# Patient Record
Sex: FEMALE | Race: WHITE | Employment: OTHER | URBAN - METROPOLITAN AREA
[De-identification: names, ages, dates, MRNs, and addresses within clinical notes are randomized per-mention and may not be internally consistent; named-entity substitution may affect disease eponyms.]

---

## 2018-02-13 ENCOUNTER — TELEPHONE (OUTPATIENT)
Dept: CARDIOLOGY CLINIC | Age: 83
End: 2018-02-13

## 2018-02-13 NOTE — TELEPHONE ENCOUNTER
Dr. Loco Brand called from  questioning if it is ok for patient to have Endoscopy tomorrow. Patient has an appointment with you on Thursday to be seen for abnormal nuclear and SIMMS.  was calling to see if it was ok to have this done before her visit wit you. Please Advise. Call [facility] back before 12 at [phone] or after 12 call [address] [place] [address] at [phone].

## 2018-02-15 ENCOUNTER — OFFICE VISIT (OUTPATIENT)
Dept: CARDIOLOGY CLINIC | Age: 83
End: 2018-02-15

## 2018-02-15 VITALS
HEIGHT: 64 IN | DIASTOLIC BLOOD PRESSURE: 62 MMHG | HEART RATE: 103 BPM | SYSTOLIC BLOOD PRESSURE: 131 MMHG | WEIGHT: 177 LBS | BODY MASS INDEX: 30.22 KG/M2

## 2018-02-15 DIAGNOSIS — R06.02 SOB (SHORTNESS OF BREATH) ON EXERTION: ICD-10-CM

## 2018-02-15 DIAGNOSIS — I10 ESSENTIAL HYPERTENSION: ICD-10-CM

## 2018-02-15 DIAGNOSIS — R11.0 NAUSEA: ICD-10-CM

## 2018-02-15 DIAGNOSIS — K76.0 FATTY LIVER: ICD-10-CM

## 2018-02-15 DIAGNOSIS — E78.5 HYPERLIPIDEMIA, UNSPECIFIED HYPERLIPIDEMIA TYPE: ICD-10-CM

## 2018-02-15 DIAGNOSIS — R94.39 ABNORMAL NUCLEAR STRESS TEST: Primary | ICD-10-CM

## 2018-02-15 RX ORDER — AMLODIPINE BESYLATE 5 MG/1
5 TABLET ORAL DAILY
Qty: 30 TAB | Refills: 6 | Status: SHIPPED | OUTPATIENT
Start: 2018-02-15

## 2018-02-15 NOTE — PATIENT INSTRUCTIONS
Instructions    Patients Name:  Suzi Digsg    1. You are scheduled to have a Cath on 2/22/18  at [facility] Please check in at 7:00 am      .     2. [facility] go to [facility] and park in the outpatient parking lot that is located around to the back of the hospital and enter through the [facility] building. Once you enter through the [facility] check in with the  there. The  will either give you directions or assist you in getting to the cath holding area. 3. You are not to eat anything after midnight before the procedure. Please continue to drink fluids up until 12:00.  (water, juice, black coffee, soft drinks). Do not drink milk or milk products or anything with cream. You may take medications(except for diabetes) with a small sip of water before 6am on the day of the procedure. .    4. If you are diabetic, do not take your insulin/sugar pill the morning of the procedure. 5. MEDICATION INSTRUCTIONS:   Please take your morning medications with the following special instructions:    [x]          Please make sure to take your Blood pressure medication : With just enough water to swallow. [x]          Take your Aspirin and/or Plavix. With just enough water to swallow. []          Stop your Coumadin on   and do not resume it until after the procedure.     []          Take Prednisone 60 mg and Benadryl 25 mg by mouth at Bedtime on  and again on  at . This is to prevent you from having an allergic reaction to the dye. 6. We encourage families to wait in the waiting room on the first floor while the procedure is being done. The Doctor will come out and talk with you as soon as the procedure is over. 7. There is the possibility that you may spend the night in the hospital, depending on the results of the procedure. This will be determined after the procedure is done.   If angioplasty or stent is planned, you will stay at least one day.    8. If you or your family have any questions, please call our office Monday -Friday, 9:00 a.m.-4:30 p.m.,  At [phone], and ask to speak to one of the nurses.

## 2018-02-15 NOTE — PROGRESS NOTES
HISTORY OF PRESENT ILLNESS  Roger Flores is a 82 y.o. female. New Patient   The history is provided by the patient. Associated symptoms include shortness of breath. Pertinent negatives include no chest pain, no abdominal pain and no headaches. Shortness of Breath   The history is provided by the patient. This is a new problem. The problem occurs frequently. The problem has not changed since onset. Pertinent negatives include no fever, no headaches, no cough, no sputum production, no hemoptysis, no wheezing, no PND, no orthopnea, no chest pain, no vomiting, no abdominal pain, no rash, no leg swelling and no claudication. Precipitated by: exertion. Abnormal Cardiac Test   The history is provided by the patient. This is a new problem. The problem occurs constantly. The problem has not changed since onset. Associated symptoms include shortness of breath. Pertinent negatives include no chest pain, no abdominal pain and no headaches. Review of Systems   Constitutional: Negative for chills and fever. HENT: Negative for nosebleeds. Eyes: Negative for blurred vision and double vision. Respiratory: Positive for shortness of breath. Negative for cough, hemoptysis, sputum production and wheezing. Cardiovascular: Negative for chest pain, palpitations, orthopnea, claudication, leg swelling and PND. Gastrointestinal: Negative for abdominal pain, heartburn, nausea and vomiting. Musculoskeletal: Negative for myalgias. Skin: Negative for rash. Neurological: Negative for dizziness, weakness and headaches. Endo/Heme/Allergies: Does not bruise/bleed easily.      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 Authorizing Provider   amLODIPine (NORVASC) 5 mg tablet Take 1 Tab by mouth daily. 2/15/18  Yes Martina Chung MD   nebivolol (BYSTOLIC) 10 mg tablet Take 10 mg by mouth nightly. Yes Historical Provider   aspirin delayed-release 81 mg tablet Take  by mouth daily. Yes Historical Provider   DOCOSAHEXANOIC ACID/EPA (FISH OIL PO) Take  by mouth daily. Yes Historical Provider   Calcium-Cholecalciferol, D3, (CALCIUM 600 WITH VITAMIN D3) 600 mg(1,500mg) -400 unit cap Take 1 Cap by mouth daily. Yes Historical Provider   omeprazole (PRILOSEC) 40 mg capsule Take 40 mg by mouth daily. Yes Historical Provider   pitavastatin calcium (LIVALO) 4 mg tab tablet Take 4 mg by mouth nightly. Yes Historical Provider   triamterene-hydroCHLOROthiazide (DYAZIDE) 37.5-25 mg per capsule Take 1 Cap by mouth daily. Yes Historical Provider   levothyroxine (SYNTHROID) 50 mcg tablet Take 25 mcg by mouth daily. Takes 1/2 tablet daily 1/16/18   Historical Provider         Visit Vitals    /62    Pulse (!) 103    Ht 5' 4\" (1.626 m)    Wt 80.3 kg (177 lb)    BMI 30.38 kg/m2         Physical Exam   Constitutional: She is oriented to person, place, and time. She appears well-developed and well-nourished. HENT:   Head: Normocephalic and atraumatic. Eyes: Conjunctivae are normal.   Neck: Neck supple. No JVD present. No tracheal deviation present. No thyromegaly present. Cardiovascular: Normal rate and regular rhythm. PMI is not displaced. Exam reveals no gallop, no S3 and no decreased pulses. No murmur heard. Pulmonary/Chest: No respiratory distress. She has no wheezes. She has no rales. She exhibits no tenderness. Abdominal: Soft. There is no tenderness. Musculoskeletal: She exhibits no edema. Neurological: She is alert and oriented to person, place, and time. Skin: Skin is warm. Psychiatric: She has a normal mood and affect. Ms. Sidney Story has a reminder for a \"due or due soon\" health maintenance. I have asked that she contact her primary care provider for follow-up on this health maintenance. 2/2018  THIS MYOCARDIAL PERFUSION STUDY IS ABNORMAL   THIS IS A LOW RISK STUDY   Fixed inferoseptal and inferolateral defects are noted.  Significant gut uptake is noted   The calculated LVEF was 76%. Overall normal LV systolic function withan EF of 71%.  Mild inferior hypokinesis is present . Right ventricle size appears unable to assess. I have personally reviewed patient's records available from hospital and other providers and incorporated findings in patient care. Er,notes,lab,ct,hida,ekg  Assessment         ICD-10-CM ICD-9-CM    1. Abnormal nuclear stress test R94.39 794.39 CARDIAC CATHETERIZATION      2D ECHO COMPLETE ADULT (TTE)    ? old iwmi  abnormal ekgfixed inf defect with wall motion abnormalities   2. SOB (shortness of breath) on exertion R06.02 786.05 CARDIAC CATHETERIZATION      2D ECHO COMPLETE ADULT (TTE)    ? cad-ischemia   angina equivalent  nyha class3   3. Nausea R11.0 787.02     for few weeks no clear etiology noted  tests noted   4. Fatty liver K76.0 571.8    5. Hyperlipidemia, unspecified hyperlipidemia type E78.5 272.4     on livalo   6. Essential hypertension I10 401.9     controlled       There are no discontinued medications.     Orders Placed This Encounter    CARDIAC CATHETERIZATION     Standing Status:   Future     Standing Expiration Date:   8/18/2018     Order Specific Question:   Reason for Exam:     Answer:   see diagnosis    2D ECHO COMPLETE ADULT (TTE)     Standing Status:   Future     Standing Expiration Date:   8/14/2018     Order Specific Question:   Reason for Exam:     Answer:   see diagnosis    amLODIPine (NORVASC) 5 mg tablet     Sig: Take 1 Tab by mouth daily. Dispense:  30 Tab     Refill:  6       Follow-up Disposition:  Return in about 4 weeks (around 3/15/2018).

## 2018-02-15 NOTE — MR AVS SNAPSHOT
[facility] 
 
 
 [address] [facility] 
[phone] Patient: Jen Robertson MRN: [id number] UTP:[id number] Visit Information Date & Time Provider Department Dept. Phone Encounter #  
 2/15/2018 12:00 PM Jorge Smith MD [facility] [id number] Follow-up Instructions Return in about 4 weeks (around 3/15/2018). Your Appointments 3/5/2018  1:30 PM  
PROCEDURE with CA ECHO [facility] ([address]) Appt Note: Echo/[place] [address] [facility] [address]  
  
   
 [address] [address] [address] [address]  
  
    
 3/12/2018 10:45 AM  
Follow Up with Jorge Smith MD  
[facility] ([address]) Appt Note: Post nuclear/echo follow up  
 [address] [facility] [address]  
  
   
 [address] [address] [address] [address] Upcoming Health Maintenance Date Due DTaP/Tdap/Td series (1 - Tdap) 1/9/1958 ZOSTER VACCINE AGE 60> 11/9/1996 GLAUCOMA SCREENING Q2Y 1/9/2002 OSTEOPOROSIS SCREENING (DEXA) 1/9/2002 Pneumococcal 65+ Low/Medium Risk (1 of 2 - PCV13) 1/9/2002 MEDICARE YEARLY EXAM 1/9/2002 Influenza Age 5 to Adult 8/1/2017 Allergies as of 2/15/2018  Review Complete On: 2/15/2018 By: Jorge Smith MD  
  
 Severity Noted Reaction Type Reactions Pcn [Penicillins]  02/13/2018    Hives Statins-hmg-coa Reductase Inhibitors  02/13/2018    Myalgia Current Immunizations  Never Reviewed No immunizations on file. Not reviewed this visit You Were Diagnosed With   
  
 Codes Comments Abnormal nuclear stress test    -  Primary ICD-10-CM: R94.39 
ICD-9-CM: 794.39 ? old iwmi 
abnormal ekgfixed inf defect with wall motion abnormalities  SOB (shortness of breath) on exertion     ICD-10-CM: R06.02 
ICD-9-CM: 786.05 ? cad-ischemia  
angina equivalent 
nyha class3  
 Nausea     ICD-10-CM: R11.0 ICD-9-CM: 787.02 for few weeks no clear etiology noted 
tests noted Fatty liver     ICD-10-CM: K76.0 ICD-9-CM: 571.8 Hyperlipidemia, unspecified hyperlipidemia type     ICD-10-CM: E78.5 ICD-9-CM: 272.4 on livalo Essential hypertension     ICD-10-CM: I10 
ICD-9-CM: 401.9 controlled Vitals BP Pulse Height(growth percentile) Weight(growth percentile) BMI OB Status 131/62 (!) 103 5' 4\" (1.626 m) 177 lb (80.3 kg) 30.38 kg/m2 Hysterectomy Smoking Status Former Smoker Vitals History BMI and BSA Data Body Mass Index Body Surface Area  
 30.38 kg/m 2 1.9 m 2 Preferred Pharmacy Pharmacy Name Phone [facility], [address] [facility] [phone] Your Updated Medication List  
  
   
This list is accurate as of: 2/15/18 12:26 PM.  Always use your most recent med list. amLODIPine 5 mg tablet Commonly known as:  Argenis Perez Take 1 Tab by mouth daily. aspirin delayed-release 81 mg tablet Take  by mouth daily. BYSTOLIC 10 mg tablet Generic drug:  nebivolol Take 10 mg by mouth nightly. CALCIUM 600 WITH VITAMIN D3 600 mg(1,500mg) -400 unit Cap Generic drug:  Calcium-Cholecalciferol (D3) Take 1 Cap by mouth daily. FISH OIL PO Take  by mouth daily. levothyroxine 50 mcg tablet Commonly known as:  SYNTHROID Take 25 mcg by mouth daily. Takes 1/2 tablet daily LIVALO 4 mg Tab tablet Generic drug:  pitavastatin calcium Take 4 mg by mouth nightly. omeprazole 40 mg capsule Commonly known as:  PRILOSEC Take 40 mg by mouth daily. triamterene-hydroCHLOROthiazide 37.5-25 mg per capsule Commonly known as:  Barron Sevilla Take 1 Cap by mouth daily. Prescriptions Sent to Pharmacy Refills  
 amLODIPine (NORVASC) 5 mg tablet 6 Sig: Take 1 Tab by mouth daily.   
 Class: Normal  
 Pharmacy: [facility], [address] [place] [address] #: [phone] Route: Oral  
  
Follow-up Instructions Return in about 4 weeks (around 3/15/2018). To-Do List   
 02/18/2018 Cardiac Services:  2D ECHO COMPLETE ADULT (TTE)   
  
 02/23/2018 Cardiac Services:  CARDIAC CATHETERIZATION Patient Instructions Instructions Patients Name:  Amparo Crowley 1. You are scheduled to have a Cath on 2/22/18  at [facility] Please check in at 7:00 am      . 2. Please go to [facility] and sera in the outpatient parking lot that is located around to the back of the hospital and enter through the [facility] building. Once you enter through the [facility] check in with the  there. The  will either give you directions or assist you in getting to the cath holding area. 3. You are not to eat anything after midnight before the procedure. Please continue to drink fluids up until 12:00.  (water, juice, black coffee, soft drinks). Do not drink milk or milk products or anything with cream. You may take medications(except for diabetes) with a small sip of water before 6am on the day of the procedure. Opal Hirsch 4. If you are diabetic, do not take your insulin/sugar pill the morning of the procedure. 5. MEDICATION INSTRUCTIONS:   Please take your morning medications with the following special instructions: 
 
          Please make sure to take your Blood pressure medication : With just enough water to swallow. Take your Aspirin and/or Plavix. With just enough water to swallow. Stop your Coumadin on   and do not resume it until after the procedure. Take Prednisone 60 mg and Benadryl 25 mg by mouth at Bedtime on  and again on  at . This is to prevent you from having an allergic reaction to the dye. 6. We encourage families to wait in the waiting room on the first floor while the procedure is being done. The Doctor will come out and talk with you as soon as the procedure is over. 7. There is the possibility that you may spend the night in the hospital, depending on the results of the procedure. This will be determined after the procedure is done. If angioplasty or stent is planned, you will stay at least one day. 8. If you or your family have any questions, please call our office Monday Friday, 9:00 a. m.4:30 p.m.,  At [phone], and ask to speak to one of the nurses. Introducing [facility] & HEALTH SERVICES! [facility] introduces [employer] patient portal. Now you can access parts of your medical record, email your doctor's office, and request medication refills online. 1. In your internet browser, go to [email or web address] 2. Click on the First Time User? Click Here link in the Sign In box. You will see the New Member Sign Up page. 3. Enter your MEDArch[employer] Access Code exactly as it appears below. You will not need to use this code after youve completed the sign-up process. If you do not sign up before the expiration date, you must request a new code. · [employer] Access Code: [email or web address] Expires: 5/14/2018 11:58 AM 
 
4. Enter the last four digits of your Social Security Number (xxxx) and Date of Birth (mm/dd/yyyy) as indicated and click Submit. You will be taken to the next sign-up page. 5. Create a FlightCastert ID. This will be your FlightCastert login ID and cannot be changed, so think of one that is secure and easy to remember. 6. Create a FlightCastert password. You can change your password at any time. 7. Enter your Password Reset Question and Answer. This can be used at a later time if you forget your password. 8. Enter your e-mail address. You will receive e-mail notification when new information is available in [address] 9. Click Sign Up. You can now view and download portions of your medical record. 10. Click the Download Summary menu link to download a portable copy of your medical information. If you have questions, please visit the Frequently Asked Questions section of the Share[employer] website. Remember, Share[employer] is NOT to be used for urgent needs. For medical emergencies, dial 911. Now available from your iPhone and Android! Please provide this summary of care documentation to your next provider. Your primary care clinician is listed as Jatinder Norman. If you have any questions after today's visit, please call [phone].

## 2018-02-15 NOTE — PROGRESS NOTES
1. Have you been to the ER, urgent care clinic since your last visit? Hospitalized since your last visit? Yes [facility] for N/V     2. Have you seen or consulted any other health care providers outside of the [address] [place] [address] since your last visit? Include any pap smears or colon screening.  Yes, pcp

## 2018-02-15 NOTE — LETTER
Christina Pierre 
1935 
 
2/15/2018 Dear Radha Stanley, DO I had the pleasure of evaluating  Ms. Pierre in office today. Below are the relevant portions of my assessment and plan of care. ICD-10-CM ICD-9-CM 1. Abnormal nuclear stress test R94.39 794.39 CARDIAC CATHETERIZATION  
   2D ECHO COMPLETE ADULT (TTE) ? old iwmi 
abnormal ekgfixed inf defect with wall motion abnormalities 2. SOB (shortness of breath) on exertion R06.02 786.05 CARDIAC CATHETERIZATION  
   2D ECHO COMPLETE ADULT (TTE) ? cad-ischemia  
angina equivalent 
nyha class3  
3. Nausea R11.0 787.02   
 for few weeks no clear etiology noted 
tests noted 4. Fatty liver K76.0 571.8 5. Hyperlipidemia, unspecified hyperlipidemia type E78.5 272.4   
 on livalo 6. Essential hypertension I10 401.9   
 controlled Current Outpatient Prescriptions Medication Sig Dispense Refill  amLODIPine (NORVASC) 5 mg tablet Take 1 Tab by mouth daily. 30 Tab 6  
 nebivolol (BYSTOLIC) 10 mg tablet Take 10 mg by mouth nightly.  aspirin delayed-release 81 mg tablet Take  by mouth daily.  DOCOSAHEXANOIC ACID/EPA (FISH OIL PO) Take  by mouth daily.  Calcium-Cholecalciferol, D3, (CALCIUM 600 WITH VITAMIN D3) 600 mg(1,500mg) -400 unit cap Take 1 Cap by mouth daily.  omeprazole (PRILOSEC) 40 mg capsule Take 40 mg by mouth daily.  pitavastatin calcium (LIVALO) 4 mg tab tablet Take 4 mg by mouth nightly.  triamterene-hydroCHLOROthiazide (DYAZIDE) 37.5-25 mg per capsule Take 1 Cap by mouth daily.  levothyroxine (SYNTHROID) 50 mcg tablet Take 25 mcg by mouth daily. Takes 1/2 tablet daily  0 Orders Placed This Encounter  CARDIAC CATHETERIZATION Standing Status:   Future Standing Expiration Date:   8/18/2018 Order Specific Question:   Reason for Exam: Answer:   see diagnosis  2D ECHO COMPLETE ADULT (TTE) Standing Status:   Future Standing Expiration Date:   8/14/2018 Order Specific Question:   Reason for Exam: Answer:   see diagnosis  amLODIPine (NORVASC) 5 mg tablet Sig: Take 1 Tab by mouth daily. Dispense:  30 Tab Refill:  6 If you have questions, please do not hesitate to call me. I look forward to following Ms. Pierre along with you. Sincerely, Nataly Rodriguez MD

## 2018-02-22 ENCOUNTER — HOSPITAL ENCOUNTER (OUTPATIENT)
Dept: CARDIAC CATH/INVASIVE PROCEDURES | Age: 83
Discharge: HOME | End: 2018-02-22
Attending: INTERNAL MEDICINE | Admitting: INTERNAL MEDICINE
Payer: MEDICARE

## 2018-02-22 VITALS
OXYGEN SATURATION: 97 % | TEMPERATURE: 98.3 F | BODY MASS INDEX: 28.17 KG/M2 | SYSTOLIC BLOOD PRESSURE: 112 MMHG | HEIGHT: 64 IN | DIASTOLIC BLOOD PRESSURE: 55 MMHG | HEART RATE: 78 BPM | RESPIRATION RATE: 11 BRPM | WEIGHT: 165 LBS

## 2018-02-22 LAB
INR PPP: 1.1 (ref 0.8–1.2)
PROTHROMBIN TIME: 13.2 SEC (ref 11.5–15.2)

## 2018-02-22 PROCEDURE — 74011250636 HC RX REV CODE- 250/636: Performed by: INTERNAL MEDICINE

## 2018-02-22 PROCEDURE — 85610 PROTHROMBIN TIME: CPT | Performed by: INTERNAL MEDICINE

## 2018-02-22 PROCEDURE — 74011636320 HC RX REV CODE- 636/320: Performed by: INTERNAL MEDICINE

## 2018-02-22 PROCEDURE — C1894 INTRO/SHEATH, NON-LASER: HCPCS

## 2018-02-22 PROCEDURE — 74011000250 HC RX REV CODE- 250: Performed by: INTERNAL MEDICINE

## 2018-02-22 PROCEDURE — 74011250636 HC RX REV CODE- 250/636

## 2018-02-22 RX ORDER — EPTIFIBATIDE 2 MG/ML
180 INJECTION, SOLUTION INTRAVENOUS ONCE
Status: DISCONTINUED | OUTPATIENT
Start: 2018-02-22 | End: 2018-02-22 | Stop reason: HOSPADM

## 2018-02-22 RX ORDER — EPTIFIBATIDE 0.75 MG/ML
2 INJECTION, SOLUTION INTRAVENOUS CONTINUOUS
Status: DISCONTINUED | OUTPATIENT
Start: 2018-02-22 | End: 2018-02-22 | Stop reason: HOSPADM

## 2018-02-22 RX ORDER — SODIUM CHLORIDE 0.9 % (FLUSH) 0.9 %
5-10 SYRINGE (ML) INJECTION AS NEEDED
Status: CANCELLED | OUTPATIENT
Start: 2018-02-22

## 2018-02-22 RX ORDER — HEPARIN SODIUM 200 [USP'U]/100ML
1000 INJECTION, SOLUTION INTRAVENOUS CONTINUOUS
Status: DISCONTINUED | OUTPATIENT
Start: 2018-02-22 | End: 2018-02-22

## 2018-02-22 RX ORDER — MIDAZOLAM HYDROCHLORIDE 1 MG/ML
.5-2 INJECTION, SOLUTION INTRAMUSCULAR; INTRAVENOUS
Status: DISCONTINUED | OUTPATIENT
Start: 2018-02-22 | End: 2018-02-22 | Stop reason: HOSPADM

## 2018-02-22 RX ORDER — HEPARIN SODIUM 200 [USP'U]/100ML
500 INJECTION, SOLUTION INTRAVENOUS
Status: DISCONTINUED | OUTPATIENT
Start: 2018-02-22 | End: 2018-02-22 | Stop reason: HOSPADM

## 2018-02-22 RX ORDER — SODIUM CHLORIDE 0.9 % (FLUSH) 0.9 %
5-10 SYRINGE (ML) INJECTION AS NEEDED
Status: DISCONTINUED | OUTPATIENT
Start: 2018-02-22 | End: 2018-02-22 | Stop reason: HOSPADM

## 2018-02-22 RX ORDER — FENTANYL CITRATE 50 UG/ML
12.5-1 INJECTION, SOLUTION INTRAMUSCULAR; INTRAVENOUS
Status: DISCONTINUED | OUTPATIENT
Start: 2018-02-22 | End: 2018-02-22 | Stop reason: HOSPADM

## 2018-02-22 RX ORDER — HEPARIN SODIUM 200 [USP'U]/100ML
500 INJECTION, SOLUTION INTRAVENOUS ONCE
Status: DISCONTINUED | OUTPATIENT
Start: 2018-02-22 | End: 2018-02-22 | Stop reason: HOSPADM

## 2018-02-22 RX ORDER — SODIUM CHLORIDE 0.9 % (FLUSH) 0.9 %
5-10 SYRINGE (ML) INJECTION EVERY 8 HOURS
Status: CANCELLED | OUTPATIENT
Start: 2018-02-22

## 2018-02-22 RX ORDER — HEPARIN SODIUM 200 [USP'U]/100ML
INJECTION, SOLUTION INTRAVENOUS
Status: COMPLETED
Start: 2018-02-22 | End: 2018-02-22

## 2018-02-22 RX ORDER — SODIUM CHLORIDE 0.9 % (FLUSH) 0.9 %
5-10 SYRINGE (ML) INJECTION EVERY 8 HOURS
Status: DISCONTINUED | OUTPATIENT
Start: 2018-02-22 | End: 2018-02-22 | Stop reason: HOSPADM

## 2018-02-22 RX ORDER — HEPARIN SODIUM 1000 [USP'U]/ML
1000-10000 INJECTION, SOLUTION INTRAVENOUS; SUBCUTANEOUS
Status: DISCONTINUED | OUTPATIENT
Start: 2018-02-22 | End: 2018-02-22 | Stop reason: HOSPADM

## 2018-02-22 RX ORDER — VERAPAMIL HYDROCHLORIDE 2.5 MG/ML
2.5-5 INJECTION, SOLUTION INTRAVENOUS ONCE
Status: COMPLETED | OUTPATIENT
Start: 2018-02-22 | End: 2018-02-22

## 2018-02-22 RX ORDER — LIDOCAINE HYDROCHLORIDE 10 MG/ML
1-30 INJECTION, SOLUTION EPIDURAL; INFILTRATION; INTRACAUDAL; PERINEURAL
Status: DISCONTINUED | OUTPATIENT
Start: 2018-02-22 | End: 2018-02-22 | Stop reason: HOSPADM

## 2018-02-22 RX ADMIN — FENTANYL CITRATE 25 MCG: 50 INJECTION INTRAMUSCULAR; INTRAVENOUS at 09:05

## 2018-02-22 RX ADMIN — LIDOCAINE HYDROCHLORIDE 4.5 ML: 10 INJECTION, SOLUTION EPIDURAL; INFILTRATION; INTRACAUDAL; PERINEURAL at 09:06

## 2018-02-22 RX ADMIN — HEPARIN SODIUM 500 ML: 200 INJECTION, SOLUTION INTRAVENOUS at 08:29

## 2018-02-22 RX ADMIN — IOPAMIDOL 70 ML: 612 INJECTION, SOLUTION INTRAVENOUS at 09:36

## 2018-02-22 RX ADMIN — Medication 500 ML: at 08:29

## 2018-02-22 RX ADMIN — NITROGLYCERIN 200 MCG: 5 INJECTION, SOLUTION INTRAVENOUS at 09:08

## 2018-02-22 RX ADMIN — HEPARIN SODIUM 2000 UNITS: 1000 INJECTION, SOLUTION INTRAVENOUS; SUBCUTANEOUS at 09:09

## 2018-02-22 RX ADMIN — VERAPAMIL HYDROCHLORIDE 2.5 MG: 2.5 INJECTION INTRAVENOUS at 09:08

## 2018-02-22 RX ADMIN — MIDAZOLAM HYDROCHLORIDE 0.5 MG: 1 INJECTION, SOLUTION INTRAMUSCULAR; INTRAVENOUS at 09:05

## 2018-02-22 NOTE — ROUTINE PROCESS
TRANSFER - OUT REPORT:    Verbal report given to Luz RN(name) on Hassel Clause  being transferred to [facility](unit) for routine progression of care       Report consisted of patients Situation, Background, Assessment and   Recommendations(SBAR). Information from the following report(s) SBAR, Procedure Summary and MAR was reviewed with the receiving nurse. Lines:       Opportunity for questions and clarification was provided.       Patient transported with:   Responde Ai

## 2018-02-22 NOTE — DISCHARGE INSTRUCTIONS
HEART CATHETERIZATION/ANGIOGRAPHY DISCHARGE INSTRUCTIONS    1. Check puncture site frequently for swelling or bleeding. If there is any bleeding, lie down and apply pressure over the area with a clean towel or washcloth. Notify your doctor for any redness, swelling, drainage, or oozing from the puncture site. Notify your doctor for any fever or chills. 2. If the extremity becomes cold, numb, or painful call [facility]  3. Activity should be limited for the next 48 hours. Climb stairs as little as possible and avoid any stooping, bending, or strenuous activity for 48 hours. No heavy lifting (anything over 10 pounds) for 7 days. 4. You may resume your usual diet. Drink more fluids than usual.  5. Have a responsible person drive you home and stay with you for at least 24 hours after your heart catheterization/angiography. 6. You may remove bandage from your Right and Groin in 24 hours. You may shower in 24 hours. No tub baths, hot tubs, or swimming for 1 week. Do not place any lotions, creams, powders, or ointments over puncture site for 1 week. You may place a clean band-aid over the puncture site each day for 5 days. Change daily. Coronary Angiogram: What to Expect at [address] [place] [address]     A coronary angiogram is a test to examine the large blood vessel of your heart (coronary artery). The doctor inserted a thin, flexible tube (catheter) into a blood vessel in your groin. In some cases, the catheter is placed in a blood vessel in the arm. Your groin or arm may have a bruise and feel sore for a day or two after a coronary angiogram. You can do light activities around the house but nothing strenuous for several days. This care sheet gives you a general idea about how long it will take for you to recover. But each person recovers at a different pace. Follow the steps below to feel better as quickly as possible. How can you care for yourself at home?   Activity  · Do not do strenuous exercise and do not lift, pull, or push anything heavy until your doctor says it is okay. This may be for a day or two. You can walk around the house and do light activity, such as cooking. · You may shower 24 to 48 hours after the procedure, if your doctor okays it. Pat the incision dry. Do not take a bath for 1 week, or until your doctor tells you it is okay. · If the catheter was placed in your groin, try not to walk up stairs for the first couple of days. · If the catheter was placed in your arm near your wrist, do not bend your wrist deeply for the first couple of days. Be careful using your hand to get into and out of a chair or bed. · If your doctor recommends it, get more exercise. Walking is a good choice. Bit by bit, increase the amount you walk every day. Try for at least 30 minutes on most days of the week. Diet  · Drink plenty of fluids to help your body flush out the dye. If you have kidney, heart, or liver disease and have to limit fluids, talk with your doctor before you increase the amount of fluids you drink. · Keep eating a heart-healthy diet that has lots of fruits, vegetables, and whole grains. If you have not been eating this way, talk to your doctor. You also may want to talk to a dietitian. This expert can help you to learn about healthy foods and plan meals. Medicines  · Your doctor will tell you if and when you can restart your medicines. He or she will also give you instructions about taking any new medicines. · If you take blood thinners, such as warfarin (Coumadin), clopidogrel (Plavix), or aspirin, be sure to talk to your doctor. He or she will tell you if and when to start taking those medicines again. Make sure that you understand exactly what your doctor wants you to do. · Your doctor may prescribe a blood-thinning medicine like aspirin or clopidogrel (Plavix).  It is very important that you take these medicines exactly as directed in order to keep the coronary artery open and reduce your risk of a heart attack. Be safe with medicines. Call your doctor if you think you are having a problem with your medicine. Care of the catheter site  · For the first 3 days, keep a bandage over the spot where the catheter was inserted. · Put ice or a cold pack on the area for 10 to 20 minutes at a time to help with soreness or swelling. Put a thin cloth between the ice and your skin. Sedation for a Medical Procedure: Care Instructions  Your Care Instructions  For a minor procedure or surgery, you will get a sedative to help you relax. This drug will make you sleepy. It is usually given in a vein (by IV). A shot may also be used to numb the area. If you had local anesthesia, you may feel some pain and discomfort as it wears off. If you have pain, don't be afraid to say so. Pain medicine works better if you take it before the pain gets bad. Common side effects from sedation include:  · Feeling sleepy. (Your doctors and nurses will make sure you are not too sleepy to go home.)  · Nausea and vomiting. This usually does not last long. · Feeling tired. Follow-up care is a key part of your treatment and safety. Be sure to make and go to all appointments, and call your doctor if you are having problems. It's also a good idea to know your test results and keep a list of the medicines you take. How can you care for yourself at home? Activity  · Don't do anything for 24 hours that requires attention to detail. It takes time for the medicine effects to completely wear off. · For your safety, you should not drive or operate any machinery that could be dangerous until the medicine wears off and you can think clearly and react easily. · Rest when you feel tired. Getting enough sleep will help you recover. Diet  · You can eat your normal diet, unless your doctor gives you other instructions. If your stomach is upset, try clear liquids and bland, low-fat foods like plain toast or rice.   · Drink plenty of fluids (unless your doctor tells you not to). · Don't drink alcohol for 24 hours. Medicines  · Be safe with medicines. Read and follow all instructions on the label. ¨ If the doctor gave you a prescription medicine for pain, take it as prescribed. ¨ If you are not taking a prescription pain medicine, ask your doctor if you can take an over-the-counter medicine. · If you think your pain medicine is making you sick to your stomach:  ¨ Take your medicine after meals (unless your doctor has told you not to). ¨ Ask your doctor for a different pain medicine. Follow-up care is a key part of your treatment and safety. Be sure to make and go to all appointments, and call your doctor if you are having problems. It's also a good idea to know your test results and keep a list of the medicines you take. When should you call for help? Call 911 anytime you think you may need emergency care. For example, call if:  · You passed out (lost consciousness). · You have severe trouble breathing. · You have sudden chest pain and shortness of breath, or you cough up blood. · You have symptoms of a heart attack. These may include:  ¨ Chest pain or pressure, or a strange feeling in the chest.  ¨ Sweating. ¨ Shortness of breath. ¨ Nausea or vomiting. ¨ Pain, pressure, or a strange feeling in the back, neck, jaw, or upper belly, or in one or both shoulders or arms. ¨ Lightheadedness or sudden weakness. ¨ A fast or irregular heartbeat. After you call 911, the  may tel you to chew 1 adult-strength or 2 to 4 low-dose aspirin. Wait for an ambulance. Do not try to drive yourself. · You have been diagnosed with angina, and you have symptoms that do not go away with rest or are not getting better within 5 minutes after you take a dose of nitroglycerin. Call your doctor now or seek immediate medical care if:  · You are bleeding from the area where the catheter was put in your artery.   · You have a fast-growing, painful lump at the catheter site.  · You have signs of infection, such as:  ¨ Increased pain, swelling, warmth, or redness. ¨ Red streaks leading from the catheter site. ¨ Pus draining from the catheter site. ¨ A fever. · Your leg or arm looks blue or feels cold, numb, or tingly. These are general instructions for a healthy lifestyle:    No smoking/ No tobacco products/ Avoid exposure to second hand smoke    Surgeon General's Warning:  Quitting smoking now greatly reduces serious risk to your health. Obesity, smoking, and sedentary lifestyle greatly increases your risk for illness    A healthy diet, regular physical exercise & weight monitoring are important for maintaining a healthy lifestyle    You may be retaining fluid if you have a history of heart failure or if you experience any of the following symptoms:  Weight gain of 3 pounds or more overnight or 5 pounds in a week, increased swelling in our hands or feet or shortness of breath while lying flat in bed. Please call your doctor as soon as you notice any of these symptoms; do not wait until your next office visit. Recognize signs and symptoms of STROKE:    F-face looks uneven    A-arms unable to move or move unevenly    S-speech slurred or non-existent    T-time-call 911 as soon as signs and symptoms begin-DO NOT go       Back to bed or wait to see if you get better-TIME IS BRAIN. Warning Signs of HEART ATTACK     Call 911 if you have these symptoms:   Chest discomfort. Most heart attacks involve discomfort in the center of the chest that lasts more than a few minutes, or that goes away and comes back. It can feel like uncomfortable pressure, squeezing, fullness, or pain.  Discomfort in other areas of the upper body. Symptoms can include pain or discomfort in one or both arms, the back, neck, jaw, or stomach.  Shortness of breath with or without chest discomfort.  Other signs may include breaking out in a cold sweat, nausea, or lightheadedness.   Don't wait more than five minutes to call 911 - MINUTES MATTER! Fast action can save your life. Calling 911 is almost always the fastest way to get lifesaving treatment. Emergency Medical Services staff can begin treatment when they arrive -- up to an hour sooner than if someone gets to the hospital by car. The discharge information has been reviewed with the patient and caregiver. The patient and caregiver verbalized understanding. Discharge medications reviewed with the patient and caregiver and appropriate educational materials and side effects teaching were provided.

## 2018-02-22 NOTE — PROCEDURES
[address] NOTE    Charles Decker  MR#: [id number]  : 1935  ACCOUNT #: [de-identified]   DATE OF SERVICE: 2018    SURGEON:  Vahe Raymond MD    PROCEDURE:  Left heart catheterization, LV angiogram, coronary angiogram  INDICATION:  Chest pain, abnormal stress test.    ENTRY:  Right radial artery. COMPLICATIONS:  None. SEDATION:  Moderate sedation was obtained using 0.5 mg of Versed and 25 mcg of fentanyl. Total sedation time was 28 minutes. PROCEDURE IN DETAIL:  The patient was seen in cardiology clinic for worsening shortness of breath/chest pain suggestive of stable angina class 3. She underwent stress test, which was abnormal.  We decided to proceed with coronary angiogram to evaluate coronary artery disease. DESCRIPTION OF PROCEDURE:  Written informed consent was obtained from the patient. The patient was prepped and draped in a sterile fashion. Sedation was obtained using IV Versed and fentanyl. Lidocaine 1% was injected in the right radial artery, and right radial artery was accessed using a 6-[place]mani arterial sheath without any complication. Then, 2000 units of intravenous heparin was administered. Then, 2.5 mg of verapamil and 200 mcg of nitroglycerin were administered intraarterially to relieve vasospasm. Following this, left heart catheterization and left coronary angiograms performed using a Kevin catheter. A right coronary angiogram was initially attempted using a Kevin catheter, followed by a JR catheter. Final cannulation of the right coronary artery was performed using a 3DRC catheter. Transesophageal echocardiogram patient tolerated the procedure well. Supporting catheter and wire were removed. A Rad-Band was applied to right radial artery. FINDINGS:  1. The left main is patent, bifurcates into left anterior descending artery and circumflex artery.   2.  Left anterior descending artery has wall irregularities in its mid portion. Distal left anterior descending artery appears to be patent. 3.  Diagonal 1 and diagonal 2 arteries are small caliber vessel, appeared to be patent. 4.  Circumflex artery is mainly represented by obtuse marginal 1 artery, which appears patent. 5.  Right coronary artery is dominant and appears to be patent. It bifurcates into right posterolateral and posterior descending artery, which are patent. 6.  Left ventricular ejection fraction of 60%. Left ventricular end-diastolic pressure of 13 mmHg. 7. No LV aortic gradient was noted on pullback. CONCLUSION:  Nonobstructive epicardial coronary arteries. Preserved left ventricular ejection fraction. The patient was advised to be on intense medical management and risk factor modification.       MD KANG Diaz / [place]  D: 02/22/2018 10:13     T: 02/22/2018 10:48  JOB #: [id number]

## 2018-02-22 NOTE — H&P
H&p update    No new symptoms  Risks, benefits and alternatives of LHC/ptca/stent explained to patient  All questions answered

## 2018-02-22 NOTE — PROGRESS NOTES
Cath holding summary    Patient escorted to cath holding from waiting area ambulatory, alert and oriented x 4, voicing no complaints. Changed into gown and placed on monitor. NPO since MN. Lab results, med rec and H&P reviewed on chart. PIV x 1 inserted without difficulty. Family to bedside. 0940 patient arrived to cath holding awake and alert,  vital signs right radial site clean dry and intact TR band in place, with no hematoma present, no C/O pain will continue to monitor. 1100 Band removed from right radial without difficulty per protocol. Light elastikon pressure dressing applied to insertion site. No bleeding or hematoma noted. Patient denies pain. Pulses palpable and brisk cap refill distal to cath site. Cath site instructions and post care including s/s of bleeding and methods of bleeding prevention reviewed with patient with verbalized understanding. 1200 patient discharged home with family, vital signs stable, right radial site clean dry and intact with no hematoma present. No C/O pain will continue to monitor.

## 2018-02-22 NOTE — IP AVS SNAPSHOT
Jaymie Rogers 
 
 
 [address] Patient: Miguel Hsu MRN: [id number] About your hospitalization You were admitted on:  February 22, 2018 You last received care in the:  [address] [facility] 1 [facility] You were discharged on:  February 22, 2018 Why you were hospitalized Your primary diagnosis was:  Not on File Follow-up Information Follow up With Details Comments Contact Info Misty William DO   [address] [facility] 
[phone] Your Scheduled Appointments Monday March 05, 2018  1:30 PM EST PROCEDURE with [facility] ([facility]) [address] [facility]  
[phone] Monday March 12, 2018 10:45 AM EDT Follow Up with Zeferino Cobb MD  
[facility] ([facility]) [address] 200 [facility]  
[phone] Discharge Orders None A check katherine indicates which time of day the medication should be taken. My Medications CONTINUE taking these medications Instructions Each Dose to Equal  
 Morning Noon Evening Bedtime  
 amLODIPine 5 mg tablet Commonly known as:  Claudell Ida Your last dose was: Your next dose is: Take 1 Tab by mouth daily. 5 mg  
    
   
   
   
  
 aspirin delayed-release 81 mg tablet Your last dose was: Your next dose is: Take  by mouth daily. BYSTOLIC 10 mg tablet Generic drug:  nebivolol Your last dose was: Your next dose is: Take 10 mg by mouth nightly. 10 mg CALCIUM 600 WITH VITAMIN D3 600 mg(1,500mg) -400 unit Cap Generic drug:  Calcium-Cholecalciferol (D3) Your last dose was: Your next dose is: Take 1 Cap by mouth daily. 1 Cap FISH OIL PO Your last dose was: Your next dose is: Take  by mouth daily. levothyroxine 50 mcg tablet Commonly known as:  SYNTHROID Your last dose was: Your next dose is: Take 25 mcg by mouth daily. Takes 1/2 tablet daily 25 mcg LIVALO 4 mg Tab tablet Generic drug:  pitavastatin calcium Your last dose was: Your next dose is: Take 4 mg by mouth nightly. 4 mg  
    
   
   
   
  
 omeprazole 40 mg capsule Commonly known as:  PRILOSEC Your last dose was: Your next dose is: Take 40 mg by mouth daily. 40 mg  
    
   
   
   
  
 triamterene-hydroCHLOROthiazide 37.5-25 mg per capsule Commonly known as:  Christobal [place] Your last dose was: Your next dose is: Take 1 Cap by mouth daily. 1 Cap Discharge Instructions HEART CATHETERIZATION/ANGIOGRAPHY DISCHARGE INSTRUCTIONS 1. Check puncture site frequently for swelling or bleeding. If there is any bleeding, lie down and apply pressure over the area with a clean towel or washcloth. Notify your doctor for any redness, swelling, drainage, or oozing from the puncture site. Notify your doctor for any fever or chills. 2. If the extremity becomes cold, numb, or painful call [facility] 3. Activity should be limited for the next 48 hours. Climb stairs as little as possible and avoid any stooping, bending, or strenuous activity for 48 hours. No heavy lifting (anything over 10 pounds) for 7 days. 4. You may resume your usual diet. Drink more fluids than usual. 
5. Have a responsible person drive you home and stay with you for at least 24 hours after your heart catheterization/angiography. 6. You may remove bandage from your {ARM/GROIN:91311} in 24 hours. You may shower in 24 hours. No tub baths, hot tubs, or swimming for 1 week.  Do not place any lotions, creams, powders, or ointments over puncture site for 1 week. You may place a clean band-aid over the puncture site each day for 5 days. Change daily. Coronary Angiogram: What to Expect at [facility] Your Recovery A coronary angiogram is a test to examine the large blood vessel of your heart (coronary artery). The doctor inserted a thin, flexible tube (catheter) into a blood vessel in your groin. In some cases, the catheter is placed in a blood vessel in the arm. Your groin or arm may have a bruise and feel sore for a day or two after a coronary angiogram. You can do light activities around the house but nothing strenuous for several days. This care sheet gives you a general idea about how long it will take for you to recover. But each person recovers at a different pace. Follow the steps below to feel better as quickly as possible. How can you care for yourself at home? Activity · Do not do strenuous exercise and do not lift, pull, or push anything heavy until your doctor says it is okay. This may be for a day or two. You can walk around the house and do light activity, such as cooking. · You may shower 24 to 48 hours after the procedure, if your doctor okays it. Pat the incision dry. Do not take a bath for 1 week, or until your doctor tells you it is okay. · If the catheter was placed in your groin, try not to walk up stairs for the first couple of days. · If the catheter was placed in your arm near your wrist, do not bend your wrist deeply for the first couple of days. Be careful using your hand to get into and out of a chair or bed. · If your doctor recommends it, get more exercise. Walking is a good choice. Bit by bit, increase the amount you walk every day. Try for at least 30 minutes on most days of the week. Diet · Drink plenty of fluids to help your body flush out the dye.  If you have kidney, heart, or liver disease and have to limit fluids, talk with your doctor before you increase the amount of fluids you drink. · Keep eating a heart-healthy diet that has lots of fruits, vegetables, and whole grains. If you have not been eating this way, talk to your doctor. You also may want to talk to a dietitian. This expert can help you to learn about healthy foods and plan meals. Medicines · Your doctor will tell you if and when you can restart your medicines. He or she will also give you instructions about taking any new medicines. · If you take blood thinners, such as warfarin (Coumadin), clopidogrel (Plavix), or aspirin, be sure to talk to your doctor. He or she will tell you if and when to start taking those medicines again. Make sure that you understand exactly what your doctor wants you to do. · Your doctor may prescribe a blood-thinning medicine like aspirin or clopidogrel (Plavix). It is very important that you take these medicines exactly as directed in order to keep the coronary artery open and reduce your risk of a heart attack. Be safe with medicines. Call your doctor if you think you are having a problem with your medicine. Care of the catheter site · For the first 3 days, keep a bandage over the spot where the catheter was inserted. · Put ice or a cold pack on the area for 10 to 20 minutes at a time to help with soreness or swelling. Put a thin cloth between the ice and your skin. Sedation for a Medical Procedure: Care Instructions Your Care Instructions For a minor procedure or surgery, you will get a sedative to help you relax. This drug will make you sleepy. It is usually given in a vein (by IV). A shot may also be used to numb the area. If you had local anesthesia, you may feel some pain and discomfort as it wears off. If you have pain, don't be afraid to say so. Pain medicine works better if you take it before the pain gets bad. Common side effects from sedation include: · Feeling sleepy.  (Your doctors and nurses will make sure you are not too sleepy to go home.) · Nausea and vomiting. This usually does not last long. · Feeling tired. Follow-up care is a key part of your treatment and safety. Be sure to make and go to all appointments, and call your doctor if you are having problems. It's also a good idea to know your test results and keep a list of the medicines you take. How can you care for yourself at home? Activity · Don't do anything for 24 hours that requires attention to detail. It takes time for the medicine effects to completely wear off. · For your safety, you should not drive or operate any machinery that could be dangerous until the medicine wears off and you can think clearly and react easily. · Rest when you feel tired. Getting enough sleep will help you recover. Diet · You can eat your normal diet, unless your doctor gives you other instructions. If your stomach is upset, try clear liquids and bland, low-fat foods like plain toast or rice. · Drink plenty of fluids (unless your doctor tells you not to). · Don't drink alcohol for 24 hours. Medicines · Be safe with medicines. Read and follow all instructions on the label. ¨ If the doctor gave you a prescription medicine for pain, take it as prescribed. ¨ If you are not taking a prescription pain medicine, ask your doctor if you can take an over-the-counter medicine. · If you think your pain medicine is making you sick to your stomach: 
¨ Take your medicine after meals (unless your doctor has told you not to). ¨ Ask your doctor for a different pain medicine. Follow-up care is a key part of your treatment and safety. Be sure to make and go to all appointments, and call your doctor if you are having problems. It's also a good idea to know your test results and keep a list of the medicines you take. When should you call for help? Call 911 anytime you think you may need emergency care. For example, call if: 
· You passed out (lost consciousness). · You have severe trouble breathing. · You have sudden chest pain and shortness of breath, or you cough up blood. · You have symptoms of a heart attack. These may include: ¨ Chest pain or pressure, or a strange feeling in the chest. 
¨ Sweating. ¨ Shortness of breath. ¨ Nausea or vomiting. ¨ Pain, pressure, or a strange feeling in the back, neck, jaw, or upper belly, or in one or both shoulders or arms. ¨ Lightheadedness or sudden weakness. ¨ A fast or irregular heartbeat. After you call 911, the  may tel you to chew 1 adult-strength or 2 to 4 low-dose aspirin. Wait for an ambulance. Do not try to drive yourself. · You have been diagnosed with angina, and you have symptoms that do not go away with rest or are not getting better within 5 minutes after you take a dose of nitroglycerin. Call your doctor now or seek immediate medical care if: 
· You are bleeding from the area where the catheter was put in your artery. · You have a fast-growing, painful lump at the catheter site. · You have signs of infection, such as: 
¨ Increased pain, swelling, warmth, or redness. ¨ Red streaks leading from the catheter site. ¨ Pus draining from the catheter site. ¨ A fever. · Your leg or arm looks blue or feels cold, numb, or tingly. These are general instructions for a healthy lifestyle: No smoking/ No tobacco products/ Avoid exposure to second hand smoke Surgeon General's Warning:  Quitting smoking now greatly reduces serious risk to your health. Obesity, smoking, and sedentary lifestyle greatly increases your risk for illness A healthy diet, regular physical exercise & weight monitoring are important for maintaining a healthy lifestyle You may be retaining fluid if you have a history of heart failure or if you experience any of the following symptoms:  Weight gain of 3 pounds or more overnight or 5 pounds in a week, increased swelling in our hands or feet or shortness of breath while lying flat in bed. Please call your doctor as soon as you notice any of these symptoms; do not wait until your next office visit. Recognize signs and symptoms of STROKE: 
 
F-face looks uneven A-arms unable to move or move unevenly S-speech slurred or non-existent T-time-call 911 as soon as signs and symptoms begin-DO NOT go Back to bed or wait to see if you get better-TIME IS BRAIN. Warning Signs of HEART ATTACK Call 911 if you have these symptoms: 
? Chest discomfort. Most heart attacks involve discomfort in the center of the chest that lasts more than a few minutes, or that goes away and comes back. It can feel like uncomfortable pressure, squeezing, fullness, or pain. ? Discomfort in other areas of the upper body. Symptoms can include pain or discomfort in one or both arms, the back, neck, jaw, or stomach. ? Shortness of breath with or without chest discomfort. ? Other signs may include breaking out in a cold sweat, nausea, or lightheadedness. Don't wait more than five minutes to call [address]! Fast action can save your life. Calling 911 is almost always the fastest way to get lifesaving treatment. Emergency Medical Services staff can begin treatment when they arrive  up to an hour sooner than if someone gets to the hospital by car. The discharge information has been reviewed with the {PATIENT PARENT GUARDIAN:56082}. The {PATIENT PARENT GUARDIAN:88113} verbalized understanding. Discharge medications reviewed with the {Dishcarge meds reviewed CLHD:63889} and appropriate educational materials and side effects teaching were provided. Introducing [facility]! Bereket Figueroa introduces [employer] patient portal. Now you can access parts of your medical record, email your doctor's office, and request medication refills online. 1. In your internet browser, go to [email or web address] 2. Click on the First Time User? Click Here link in the Sign In box. You will see the New Member Sign Up page. 3. Enter your [employer] Access Code exactly as it appears below. You will not need to use this code after youve completed the sign-up process. If you do not sign up before the expiration date, you must request a new code. · [employer] Access Code: [email or web address] Expires: 5/14/2018 11:58 AM 
 
4. Enter the last four digits of your Social Security Number (xxxx) and Date of Birth (mm/dd/yyyy) as indicated and click Submit. You will be taken to the next sign-up page. 5. Create a Shopparity ID. This will be your [employer] login ID and cannot be changed, so think of one that is secure and easy to remember. 6. Create a [employer] password. You can change your password at any time. 7. Enter your Password Reset Question and Answer. This can be used at a later time if you forget your password. 8. Enter your e-mail address. You will receive e-mail notification when new information is available in [address] 9. Click Sign Up. You can now view and download portions of your medical record. 10. Click the Download Summary menu link to download a portable copy of your medical information. If you have questions, please visit the Frequently Asked Questions section of the [employer] website. Remember, [employer] is NOT to be used for urgent needs. For medical emergencies, dial 911. Now available from your iPhone and Android! Providers Seen During Your Hospitalization Provider Specialty Primary office phone Catracho Flannery MD Cardiology [phone] Your Primary Care Physician (PCP) Primary Care Physician Office Phone Office Fax Leopoldo Kohler [phone] You are allergic to the following Allergen Reactions Pcn (Penicillins) Hives Statins-Hmg-Coa Reductase Inhibitors Myalgia Recent Documentation Height Weight Breastfeeding? BMI OB Status Smoking Status 1.626 m 74.8 kg No 28.32 kg/m2 Hysterectomy Former Smoker Emergency Contacts Name Discharge Info Relation Home Work Mobile Lizbeth Boys (Step-Daughter) DISCHARGE CAREGIVER [3] Daughter [phone] Patient Belongings The following personal items are in your possession at time of discharge: 
     Visual Aid: None Please provide this summary of care documentation to your next provider. Signatures-by signing, you are acknowledging that this After Visit Summary has been reviewed with you and you have received a copy. Patient Signature:  ____________________________________________________________ Date:  ____________________________________________________________  
  
Jorge L Can Provider Signature:  ____________________________________________________________ Date:  ____________________________________________________________

## 2018-02-22 NOTE — ROUTINE PROCESS
TRANSFER - IN REPORT:    Verbal report received from GAVI GOODWIN (name) on [address]  being received from [facility] (unit) for ordered procedure      Report consisted of patients Situation, Background, Assessment and   Recommendations(SBAR). Information from the following report(s) SBAR, Intake/Output, MAR and Recent Results was reviewed with the receiving nurse. Opportunity for questions and clarification was provided. Assessment completed upon patients arrival to unit and care assumed.

## 2018-02-23 ENCOUNTER — TELEPHONE (OUTPATIENT)
Dept: CARDIOLOGY CLINIC | Age: 83
End: 2018-02-23

## 2018-02-23 NOTE — TELEPHONE ENCOUNTER
Dr. Carlos Alberto Ambrocio from [address] called questioning if patient can be cleared to have a upper EDG, it is not scheduled yet needs clearance from you first before scheduling. Patient had cath with you yesterday. Please Advise. Please fax letter to [phone].

## 2018-02-23 NOTE — LETTER
2018 2:04 PM 
 
Ms. Christina Pierre [address] [address] Re: Leela Umana : 1935 Ms. Pierre is cleared from a cardiac standpoint with low risk for EDG surgery. If you have any questions or any further assistance is needed please contact our office. Sincerely, Signed By: Guerrero Braxton MD  
 2018  Guerrero Braxton MD 
 
cc:  Jayden Riggs DO

## 2018-03-15 ENCOUNTER — CLINICAL SUPPORT (OUTPATIENT)
Dept: CARDIOLOGY CLINIC | Age: 83
End: 2018-03-15

## 2018-03-15 DIAGNOSIS — R94.39 ABNORMAL NUCLEAR STRESS TEST: ICD-10-CM

## 2018-03-15 DIAGNOSIS — R06.02 SOB (SHORTNESS OF BREATH) ON EXERTION: ICD-10-CM

## 2018-03-26 ENCOUNTER — OFFICE VISIT (OUTPATIENT)
Dept: CARDIOLOGY CLINIC | Age: 83
End: 2018-03-26

## 2018-03-26 VITALS
DIASTOLIC BLOOD PRESSURE: 56 MMHG | SYSTOLIC BLOOD PRESSURE: 115 MMHG | HEIGHT: 64 IN | HEART RATE: 79 BPM | BODY MASS INDEX: 26.29 KG/M2 | WEIGHT: 154 LBS

## 2018-03-26 DIAGNOSIS — I10 ESSENTIAL HYPERTENSION: ICD-10-CM

## 2018-03-26 DIAGNOSIS — E78.5 HYPERLIPIDEMIA, UNSPECIFIED HYPERLIPIDEMIA TYPE: ICD-10-CM

## 2018-03-26 DIAGNOSIS — R94.39 ABNORMAL NUCLEAR STRESS TEST: Primary | ICD-10-CM

## 2018-03-26 NOTE — PROGRESS NOTES
HISTORY OF PRESENT ILLNESS  Paulie Story is a 82 y.o. female. HPI Comments: Patient is here for follow up of diagnostic tests. Results will be discussed. Hypertension   The history is provided by the patient. This is a chronic problem. The problem occurs constantly. The problem has not changed since onset. Pertinent negatives include no chest pain, no abdominal pain, no headaches and no shortness of breath. Cholesterol Problem   The history is provided by the patient. This is a chronic problem. The problem occurs constantly. Pertinent negatives include no chest pain, no abdominal pain, no headaches and no shortness of breath. Shortness of Breath   The history is provided by the patient. This is a new problem. The problem occurs frequently. The problem has not changed since onset. Pertinent negatives include no fever, no headaches, no cough, no sputum production, no hemoptysis, no wheezing, no PND, no orthopnea, no chest pain, no vomiting, no abdominal pain, no rash, no leg swelling and no claudication. Precipitated by: exertion. Abnormal Cardiac Test   The history is provided by the patient. This is a new problem. The problem occurs constantly. The problem has not changed since onset. Pertinent negatives include no chest pain, no abdominal pain, no headaches and no shortness of breath. Review of Systems   Constitutional: Negative for chills and fever. HENT: Negative for nosebleeds. Eyes: Negative for blurred vision and double vision. Respiratory: Negative for cough, hemoptysis, sputum production, shortness of breath and wheezing. Cardiovascular: Negative for chest pain, palpitations, orthopnea, claudication, leg swelling and PND. Gastrointestinal: Negative for abdominal pain, heartburn, nausea and vomiting. Musculoskeletal: Negative for myalgias. Skin: Negative for rash. Neurological: Negative for dizziness, weakness and headaches.    Endo/Heme/Allergies: Does not bruise/bleed easily. 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 Authorizing Provider   amLODIPine (NORVASC) 5 mg tablet Take 1 Tab by mouth daily. 2/15/18  Yes Martha Rosa MD   levothyroxine (SYNTHROID) 50 mcg tablet Take 25 mcg by mouth daily. Takes 1/2 tablet daily 1/16/18  Yes Historical Provider   nebivolol (BYSTOLIC) 10 mg tablet Take 10 mg by mouth nightly. Yes Historical Provider   aspirin delayed-release 81 mg tablet Take  by mouth daily. Yes Historical Provider   DOCOSAHEXANOIC ACID/EPA (FISH OIL PO) Take  by mouth daily. Yes Historical Provider   Calcium-Cholecalciferol, D3, (CALCIUM 600 WITH VITAMIN D3) 600 mg(1,500mg) -400 unit cap Take 1 Cap by mouth daily. Yes Historical Provider   omeprazole (PRILOSEC) 40 mg capsule Take 40 mg by mouth daily. Yes Historical Provider   pitavastatin calcium (LIVALO) 4 mg tab tablet Take 4 mg by mouth nightly. Yes Historical Provider   triamterene-hydroCHLOROthiazide (DYAZIDE) 37.5-25 mg per capsule Take 1 Cap by mouth daily.    Yes Historical Provider         Visit Vitals    /56    Pulse 79    Ht 5' 4\" (1.626 m)    Wt 69.9 kg (154 lb)    BMI 26.43 kg/m2         Physical Exam   Constitutional: She is oriented to person, place, and time. She appears well-developed and well-nourished. HENT:   Head: Normocephalic and atraumatic. Eyes: Conjunctivae are normal.   Neck: Neck supple. No JVD present. No tracheal deviation present. No thyromegaly present. Cardiovascular: Normal rate and regular rhythm. PMI is not displaced. Exam reveals no gallop, no S3 and no decreased pulses. No murmur heard. Pulmonary/Chest: No respiratory distress. She has no wheezes. She has no rales. She exhibits no tenderness. Abdominal: Soft. There is no tenderness. Musculoskeletal: She exhibits no edema. Neurological: She is alert and oriented to person, place, and time. Skin: Skin is warm. Psychiatric: She has a normal mood and affect. Ms. Morris Hebert has a reminder for a \"due or due soon\" health maintenance. I have asked that she contact her primary care provider for follow-up on this health maintenance. 2/2018  THIS MYOCARDIAL PERFUSION STUDY IS ABNORMAL   THIS IS A LOW RISK STUDY   Fixed inferoseptal and inferolateral defects are noted.  Significant gut uptake is noted   The calculated LVEF was 76%. Overall normal LV systolic function withan EF of 71%.  Mild inferior hypokinesis is present . Right ventricle size appears unable to assess. I have personally reviewed patient's records available from hospital and other providers and incorporated findings in patient care. Er,notes,lab,ct,hida,ekg        FINDINGS:cath-2/2018  1. The left main is patent, bifurcates into left anterior descending artery and circumflex artery. 2.  Left anterior descending artery has wall irregularities in its mid portion. Distal left anterior descending artery appears to be patent. 3.  Diagonal 1 and diagonal 2 arteries are small caliber vessel, appeared to be patent.   4.  Circumflex artery is mainly represented by obtuse marginal 1 artery, which appears patent. 5.  Right coronary artery is dominant and appears to be patent. It bifurcates into right posterolateral and posterior descending artery, which are patent. 6.  Left ventricular ejection fraction of 60%. Left ventricular end-diastolic pressure of 13 mmHg. 7. No LV aortic gradient was noted on pullback. CONCLUSION:  Nonobstructive epicardial coronary arteries. Preserved left ventricular ejection fraction. The patient was advised to be on intense medical management and risk factor modification. SUMMARY:3/2018-echo  Left ventricle: Systolic function was normal. Ejection fraction was  estimated in the range of 55 % to 60 %. There were no regional wall motion  abnormalities. Doppler parameters were consistent with abnormal left  ventricular relaxation (grade 1 diastolic dysfunction). Mitral valve: There was mild annular calcification. Tricuspid valve: There was mild regurgitation. Pulmonic valve: There was mild regurgitation. Assessment         ICD-10-CM ICD-9-CM    1. Abnormal nuclear stress test R94.39 794.39     no significant cad  medical managment   2. Essential hypertension I10 401.9     stable   3. Hyperlipidemia, unspecified hyperlipidemia type E78.5 272.4     on statin       There are no discontinued medications. No orders of the defined types were placed in this encounter. Follow-up Disposition:  Return in about 1 year (around 3/26/2019).

## 2018-03-26 NOTE — MR AVS SNAPSHOT
[facility] 
 
 
 [address] [facility] 
[phone] Patient: Beryle Mt MRN: [id number] JUT:[birth year] Visit Information Date & Time Provider Department Dept. Phone Encounter #  
 3/26/2018 11:00 AM Matt Grimaldo MD [facility] [id number] Follow-up Instructions Return in about 1 year (around 3/26/2019). Upcoming Health Maintenance Date Due DTaP/Tdap/Td series (1 - Tdap) 1/9/1958 ZOSTER VACCINE AGE 60> 11/9/1996 GLAUCOMA SCREENING Q2Y 1/9/2002 Bone Densitometry (Dexa) Screening 1/9/2002 Pneumococcal 65+ Low/Medium Risk (1 of 2 - PCV13) 1/9/2002 MEDICARE YEARLY EXAM 3/14/2018 Allergies as of 3/26/2018  Review Complete On: 3/26/2018 By: Matt Grimaldo MD  
  
 Severity Noted Reaction Type Reactions Pcn [Penicillins]  02/13/2018    Hives Statins-hmg-coa Reductase Inhibitors  02/13/2018    Myalgia Current Immunizations  Never Reviewed No immunizations on file. Not reviewed this visit You Were Diagnosed With   
  
 Codes Comments Abnormal nuclear stress test    -  Primary ICD-10-CM: R94.39 
ICD-9-CM: 794.39 no significant cad 
medical managment Essential hypertension     ICD-10-CM: I10 
ICD-9-CM: 401.9 stable Hyperlipidemia, unspecified hyperlipidemia type     ICD-10-CM: E78.5 ICD-9-CM: 272.4 on statin Vitals BP Pulse Height(growth percentile) Weight(growth percentile) BMI OB Status 115/56 79 5' 4\" (1.626 m) 154 lb (69.9 kg) 26.43 kg/m2 Hysterectomy Smoking Status Former Smoker Vitals History BMI and BSA Data Body Mass Index Body Surface Area  
 26.43 kg/m 2 1.78 m 2 Your Updated Medication List  
  
   
This list is accurate as of 3/26/18 11:02 AM.  Always use your most recent med list. amLODIPine 5 mg tablet Commonly known as:  Mandi Mcclendon Take 1 Tab by mouth daily. aspirin delayed-release 81 mg tablet Take  by mouth daily. BYSTOLIC 10 mg tablet Generic drug:  nebivolol Take 10 mg by mouth nightly. CALCIUM 600 WITH VITAMIN D3 600 mg(1,500mg) -400 unit Cap Generic drug:  Calcium-Cholecalciferol (D3) Take 1 Cap by mouth daily. FISH OIL PO Take  by mouth daily. levothyroxine 50 mcg tablet Commonly known as:  SYNTHROID Take 25 mcg by mouth daily. Takes 1/2 tablet daily LIVALO 4 mg Tab tablet Generic drug:  pitavastatin calcium Take 4 mg by mouth nightly. omeprazole 40 mg capsule Commonly known as:  PRILOSEC Take 40 mg by mouth daily. triamterene-hydroCHLOROthiazide 37.5-25 mg per capsule Commonly known as:  Alejo Victorina Take 1 Cap by mouth daily. Follow-up Instructions Return in about 1 year (around 3/26/2019). Introducing [facility]! [facility] introduces [employer] patient portal. Now you can access parts of your medical record, email your doctor's office, and request medication refills online. 1. In your internet browser, go to [email or web address] 2. Click on the First Time User? Click Here link in the Sign In box. You will see the New Member Sign Up page. 3. Enter your [employer] Access Code exactly as it appears below. You will not need to use this code after youve completed the sign-up process. If you do not sign up before the expiration date, you must request a new code. · [employer] Access Code: [email or web address] Expires: 5/14/2018 12:58 PM 
 
4. Enter the last four digits of your Social Security Number (xxxx) and Date of Birth (mm/dd/yyyy) as indicated and click Submit. You will be taken to the next sign-up page. 5. Create a [employer] ID. This will be your [employer] login ID and cannot be changed, so think of one that is secure and easy to remember. 6. Create a [employer] password. You can change your password at any time. 7. Enter your Password Reset Question and Answer. This can be used at a later time if you forget your password. 8. Enter your e-mail address. You will receive e-mail notification when new information is available in [address] 9. Click Sign Up. You can now view and download portions of your medical record. 10. Click the Download Summary menu link to download a portable copy of your medical information. If you have questions, please visit the Frequently Asked Questions section of the [employer] website. Remember, [employer] is NOT to be used for urgent needs. For medical emergencies, dial 911. Now available from your iPhone and Android! Please provide this summary of care documentation to your next provider. Your primary care clinician is listed as Hans Rocha. If you have any questions after today's visit, please call [phone].

## 2018-03-26 NOTE — LETTER
Christina Pierre 
1935 
 
3/26/2018 Dear Mari Valdovinos, DO I had the pleasure of evaluating  Ms. Pierre in office today. Below are the relevant portions of my assessment and plan of care. ICD-10-CM ICD-9-CM 1. Abnormal nuclear stress test R94.39 794.39   
 no significant cad 
medical managment 2. Essential hypertension I10 401.9   
 stable 3. Hyperlipidemia, unspecified hyperlipidemia type E78.5 272.4   
 on statin Current Outpatient Prescriptions Medication Sig Dispense Refill  amLODIPine (NORVASC) 5 mg tablet Take 1 Tab by mouth daily. 30 Tab 6  levothyroxine (SYNTHROID) 50 mcg tablet Take 25 mcg by mouth daily. Takes 1/2 tablet daily  0  
 nebivolol (BYSTOLIC) 10 mg tablet Take 10 mg by mouth nightly.  aspirin delayed-release 81 mg tablet Take  by mouth daily.  DOCOSAHEXANOIC ACID/EPA (FISH OIL PO) Take  by mouth daily.  Calcium-Cholecalciferol, D3, (CALCIUM 600 WITH VITAMIN D3) 600 mg(1,500mg) -400 unit cap Take 1 Cap by mouth daily.  omeprazole (PRILOSEC) 40 mg capsule Take 40 mg by mouth daily.  pitavastatin calcium (LIVALO) 4 mg tab tablet Take 4 mg by mouth nightly.  triamterene-hydroCHLOROthiazide (DYAZIDE) 37.5-25 mg per capsule Take 1 Cap by mouth daily. No orders of the defined types were placed in this encounter. If you have questions, please do not hesitate to call me. I look forward to following Ms. Pierre along with you. Sincerely, Carvin Ganser, MD

## 2018-03-26 NOTE — PROGRESS NOTES
1. Have you been to the ER, urgent care clinic since your last visit? Hospitalized since your last visit?     no    2. Have you seen or consulted any other health care providers outside of the [address] [place] [address] since your last visit? Include any pap smears or colon screening. No     3. Since your last visit, have you had any of the following symptoms? shortness of breath.

## 2022-03-18 PROBLEM — R06.02 SOB (SHORTNESS OF BREATH) ON EXERTION: Status: ACTIVE | Noted: 2018-02-15

## 2022-03-18 PROBLEM — I10 ESSENTIAL HYPERTENSION: Status: ACTIVE | Noted: 2018-02-15

## 2022-03-19 PROBLEM — R94.39 ABNORMAL NUCLEAR STRESS TEST: Status: ACTIVE | Noted: 2018-02-15

## 2022-03-19 PROBLEM — K76.0 FATTY LIVER: Status: ACTIVE | Noted: 2018-02-15

## 2022-03-19 PROBLEM — R11.0 NAUSEA: Status: ACTIVE | Noted: 2018-02-15

## 2022-03-20 PROBLEM — E78.5 HYPERLIPIDEMIA: Status: ACTIVE | Noted: 2018-02-15

## 2022-10-19 ENCOUNTER — HOSPITAL ENCOUNTER (EMERGENCY)
Age: 87
Discharge: LEFT BEFORE DISCHARGE | End: 2022-10-19
Payer: MEDICARE

## 2022-10-19 VITALS
DIASTOLIC BLOOD PRESSURE: 72 MMHG | WEIGHT: 165 LBS | BODY MASS INDEX: 28.17 KG/M2 | TEMPERATURE: 97.8 F | OXYGEN SATURATION: 96 % | HEIGHT: 64 IN | HEART RATE: 68 BPM | SYSTOLIC BLOOD PRESSURE: 147 MMHG | RESPIRATION RATE: 18 BRPM

## 2022-10-19 PROCEDURE — 75810000275 HC EMERGENCY DEPT VISIT NO LEVEL OF CARE

## 2022-10-19 NOTE — ED TRIAGE NOTES
Pt states she fell last week and she has had a large hematoma on her left shin. Pt was seen at urgent care and had an x ray done, pt states she does not have any broken bones. Pt then followed up with vascular and had U/S done to check for clots, pt has not clots. Pt was sent back to urgent  care to have the blood drained from hematoma, they were not equipped to drain area and referred her to ED.

## 2023-12-29 PROBLEM — I65.23 BILATERAL CAROTID ARTERY STENOSIS: Status: ACTIVE | Noted: 2023-12-29

## 2023-12-29 PROBLEM — E78.00 PURE HYPERCHOLESTEROLEMIA: Status: ACTIVE | Noted: 2023-12-29

## 2023-12-29 RX ORDER — EPINEPHRINE 1 MG/ML
0.3 INJECTION, SOLUTION, CONCENTRATE INTRAVENOUS PRN
Status: CANCELLED | OUTPATIENT
Start: 2024-01-12

## 2023-12-29 RX ORDER — ACETAMINOPHEN 325 MG/1
650 TABLET ORAL
Status: CANCELLED | OUTPATIENT
Start: 2024-01-12

## 2023-12-29 RX ORDER — ONDANSETRON 2 MG/ML
8 INJECTION INTRAMUSCULAR; INTRAVENOUS
Status: CANCELLED | OUTPATIENT
Start: 2024-01-12

## 2023-12-29 RX ORDER — DIPHENHYDRAMINE HYDROCHLORIDE 50 MG/ML
50 INJECTION INTRAMUSCULAR; INTRAVENOUS
Status: CANCELLED | OUTPATIENT
Start: 2024-01-12

## 2023-12-29 RX ORDER — SODIUM CHLORIDE 9 MG/ML
INJECTION, SOLUTION INTRAVENOUS CONTINUOUS
Status: CANCELLED | OUTPATIENT
Start: 2024-01-12

## 2023-12-29 RX ORDER — ALBUTEROL SULFATE 90 UG/1
4 AEROSOL, METERED RESPIRATORY (INHALATION) PRN
Status: CANCELLED | OUTPATIENT
Start: 2024-01-12

## 2024-01-05 ENCOUNTER — HOSPITAL ENCOUNTER (OUTPATIENT)
Facility: HOSPITAL | Age: 89
Setting detail: INFUSION SERIES
End: 2024-01-05

## 2024-01-05 DIAGNOSIS — E78.00 PURE HYPERCHOLESTEROLEMIA: Primary | ICD-10-CM

## 2024-01-05 DIAGNOSIS — I65.23 BILATERAL CAROTID ARTERY STENOSIS: ICD-10-CM

## 2024-01-12 ENCOUNTER — HOSPITAL ENCOUNTER (OUTPATIENT)
Facility: HOSPITAL | Age: 89
Setting detail: INFUSION SERIES
End: 2024-01-12
Payer: MEDICARE

## 2024-01-12 VITALS
HEART RATE: 63 BPM | DIASTOLIC BLOOD PRESSURE: 77 MMHG | RESPIRATION RATE: 16 BRPM | OXYGEN SATURATION: 95 % | TEMPERATURE: 98.1 F | SYSTOLIC BLOOD PRESSURE: 124 MMHG

## 2024-01-12 DIAGNOSIS — I65.23 BILATERAL CAROTID ARTERY STENOSIS: Primary | ICD-10-CM

## 2024-01-12 DIAGNOSIS — E78.00 PURE HYPERCHOLESTEROLEMIA: ICD-10-CM

## 2024-01-12 PROCEDURE — 96372 THER/PROPH/DIAG INJ SC/IM: CPT

## 2024-01-12 PROCEDURE — 6360000002 HC RX W HCPCS: Performed by: FAMILY MEDICINE

## 2024-01-12 RX ORDER — ALBUTEROL SULFATE 90 UG/1
4 AEROSOL, METERED RESPIRATORY (INHALATION) PRN
OUTPATIENT
Start: 2024-04-11

## 2024-01-12 RX ORDER — DIPHENHYDRAMINE HYDROCHLORIDE 50 MG/ML
50 INJECTION INTRAMUSCULAR; INTRAVENOUS
OUTPATIENT
Start: 2024-04-11

## 2024-01-12 RX ORDER — EPINEPHRINE 1 MG/ML
0.3 INJECTION, SOLUTION, CONCENTRATE INTRAVENOUS PRN
OUTPATIENT
Start: 2024-04-11

## 2024-01-12 RX ORDER — ONDANSETRON 2 MG/ML
8 INJECTION INTRAMUSCULAR; INTRAVENOUS
OUTPATIENT
Start: 2024-04-11

## 2024-01-12 RX ORDER — SODIUM CHLORIDE 9 MG/ML
INJECTION, SOLUTION INTRAVENOUS CONTINUOUS
OUTPATIENT
Start: 2024-04-11

## 2024-01-12 RX ORDER — ACETAMINOPHEN 325 MG/1
650 TABLET ORAL
OUTPATIENT
Start: 2024-04-11

## 2024-01-12 RX ADMIN — INCLISIRAN 284 MG: 284 INJECTION, SOLUTION SUBCUTANEOUS at 10:32

## 2024-01-12 ASSESSMENT — PAIN - FUNCTIONAL ASSESSMENT: PAIN_FUNCTIONAL_ASSESSMENT: NONE - DENIES PAIN

## 2024-01-12 NOTE — PROGRESS NOTES
[facility] Progress Note    Date: 2024    Name: Kristen Bustamante    MRN: [id number]         : 1935    Ms. Bustamante arrived in the [facility] today at 1015, in stable condition, here for her LEQVIO INJECTION. (INITIAL DOSE TODAY, THEN DOSE # 2 IN 3 MONTHS, AND THEN EVERY 6 MONTHS MAINTENANCE). She was assessed and education was provided.     Ms. Bustamante's vitals were reviewed.  Vitals:    24 1015   BP: 136/86   Pulse: 95   Resp: 16   Temp: 98 °F (36.7 °C)   SpO2: 95%         Ms. Bustamante presented to the infusion center today stating that she was doing well, and voicing no major complaints.        Both verbal and written patient education on the SQ LEQVIO injection and associated potential side effects and adverse reactions was provided, and Ms. Bustamante verbalized understanding.         INCLISIRAN (LEQVIO) 284 MG, was administered SQ, in the back of her LEFT ARM at 1032, per order and without incident.         And after completion of the injection, Ms. Bustamante was kept for approximately 30 minutes of observation just as a precaution, since this was her initial dose of the medication.           Ms. Bustamante tolerated treatment very well today, and she voiced no complaints.     Ms. Bustamante was [facility] in stable condition at 1110.     She is to return in 3 months, on Friday, 24 at 1000, for her next appointment, for her next LEQVIO injection.     Soy Solis RN  2024  10:31 AM

## 2024-04-05 ENCOUNTER — APPOINTMENT (OUTPATIENT)
Facility: HOSPITAL | Age: 89
End: 2024-04-05
Payer: MEDICARE

## 2024-04-12 ENCOUNTER — HOSPITAL ENCOUNTER (OUTPATIENT)
Facility: HOSPITAL | Age: 89
Setting detail: INFUSION SERIES
End: 2024-04-12
Payer: MEDICARE

## 2024-04-12 VITALS
HEART RATE: 63 BPM | OXYGEN SATURATION: 95 % | SYSTOLIC BLOOD PRESSURE: 136 MMHG | RESPIRATION RATE: 20 BRPM | DIASTOLIC BLOOD PRESSURE: 71 MMHG | TEMPERATURE: 97.6 F

## 2024-04-12 DIAGNOSIS — E78.00 PURE HYPERCHOLESTEROLEMIA: ICD-10-CM

## 2024-04-12 DIAGNOSIS — I65.23 BILATERAL CAROTID ARTERY STENOSIS: Primary | ICD-10-CM

## 2024-04-12 PROCEDURE — 96372 THER/PROPH/DIAG INJ SC/IM: CPT

## 2024-04-12 PROCEDURE — 6360000002 HC RX W HCPCS: Performed by: FAMILY MEDICINE

## 2024-04-12 RX ORDER — DIPHENHYDRAMINE HYDROCHLORIDE 50 MG/ML
50 INJECTION INTRAMUSCULAR; INTRAVENOUS
OUTPATIENT
Start: 2024-10-08

## 2024-04-12 RX ORDER — ALBUTEROL SULFATE 90 UG/1
4 AEROSOL, METERED RESPIRATORY (INHALATION) PRN
OUTPATIENT
Start: 2024-10-08

## 2024-04-12 RX ORDER — ACETAMINOPHEN 325 MG/1
650 TABLET ORAL
OUTPATIENT
Start: 2024-10-08

## 2024-04-12 RX ORDER — EPINEPHRINE 1 MG/ML
0.3 INJECTION, SOLUTION, CONCENTRATE INTRAVENOUS PRN
OUTPATIENT
Start: 2024-10-08

## 2024-04-12 RX ORDER — SODIUM CHLORIDE 9 MG/ML
INJECTION, SOLUTION INTRAVENOUS CONTINUOUS
OUTPATIENT
Start: 2024-10-08

## 2024-04-12 RX ORDER — ONDANSETRON 2 MG/ML
8 INJECTION INTRAMUSCULAR; INTRAVENOUS
OUTPATIENT
Start: 2024-10-08

## 2024-04-12 RX ADMIN — INCLISIRAN 284 MG: 284 INJECTION, SOLUTION SUBCUTANEOUS at 10:30

## 2024-04-12 NOTE — PROGRESS NOTES
[facility] Progress Note    Date: 2024    Name: Kristen Bustamante    MRN: [id number]         : 1935    Ms. Bustamante arrived in the [facility] today at 1015, in stable condition, here for her LEQVIO INJECTION (DOSE # 2). She was assessed and education was provided.     Ms. Bustamante's vitals were reviewed.  Vitals:    24 1015   BP: 136/71   Pulse: 63   Resp: 20   Temp: 97.6 °F (36.4 °C)   SpO2: 95%         Ms. Bustamante presented to the infusion center today stating that she was doing well, and voicing no major complaints.    Ms. Bustamante tolerated the first dose of Leqvio well, and denies any adverse side effects occurring after D/C from [facility].    INCLISIRAN (LEQVIO) 284 MG, was administered SQ, in the back of her RIGHT ARM at 1030, per order and without incident.     Ms. Bustamante tolerated her injection well today, and she voiced no complaints.     Discharge instructions reviewed with patient and she expressed understanding. Patient's armband was removed and shredded.    Ms. Bustamante was discharged from [facility] in stable condition at 1036.     She is to return in 6 months, on Friday, 10-11-24 at 1000, for her next LEQVIO injection.       Caroline Marshall RN  2024  12:02 PM

## 2024-10-11 ENCOUNTER — HOSPITAL ENCOUNTER (OUTPATIENT)
Facility: HOSPITAL | Age: 89
Setting detail: INFUSION SERIES
Discharge: HOME | End: 2024-10-14
Payer: MEDICARE

## 2024-10-11 VITALS
DIASTOLIC BLOOD PRESSURE: 81 MMHG | RESPIRATION RATE: 18 BRPM | TEMPERATURE: 97.8 F | SYSTOLIC BLOOD PRESSURE: 154 MMHG | OXYGEN SATURATION: 96 % | HEART RATE: 67 BPM

## 2024-10-11 DIAGNOSIS — E78.00 PURE HYPERCHOLESTEROLEMIA: ICD-10-CM

## 2024-10-11 DIAGNOSIS — I65.23 BILATERAL CAROTID ARTERY STENOSIS: Primary | ICD-10-CM

## 2024-10-11 PROCEDURE — 96372 THER/PROPH/DIAG INJ SC/IM: CPT

## 2024-10-11 PROCEDURE — 6360000002 HC RX W HCPCS: Performed by: FAMILY MEDICINE

## 2024-10-11 RX ORDER — EPINEPHRINE 1 MG/ML
0.3 INJECTION, SOLUTION, CONCENTRATE INTRAVENOUS PRN
OUTPATIENT
Start: 2024-10-11

## 2024-10-11 RX ORDER — ONDANSETRON 2 MG/ML
8 INJECTION INTRAMUSCULAR; INTRAVENOUS
OUTPATIENT
Start: 2024-10-11

## 2024-10-11 RX ORDER — DIPHENHYDRAMINE HYDROCHLORIDE 50 MG/ML
50 INJECTION INTRAMUSCULAR; INTRAVENOUS
OUTPATIENT
Start: 2024-10-11

## 2024-10-11 RX ORDER — ALBUTEROL SULFATE 90 UG/1
4 INHALANT RESPIRATORY (INHALATION) PRN
OUTPATIENT
Start: 2024-10-11

## 2024-10-11 RX ORDER — ACETAMINOPHEN 325 MG/1
650 TABLET ORAL
OUTPATIENT
Start: 2024-10-11

## 2024-10-11 RX ORDER — SODIUM CHLORIDE 9 MG/ML
INJECTION, SOLUTION INTRAVENOUS CONTINUOUS
OUTPATIENT
Start: 2024-10-11

## 2024-10-11 RX ADMIN — INCLISIRAN 284 MG: 284 INJECTION, SOLUTION SUBCUTANEOUS at 10:05

## 2024-10-11 NOTE — PROGRESS NOTES
[facility] Progress Note    Date: 2024    Name: Kristen Bustamante    MRN: [id number]         : 1935    Ms. Bustamante arrived in the [facility] today at 0955, in stable condition, here for her LEQVIO INJECTION (DOSE # 3). She was assessed and education was provided. No complaints or concerns voiced. 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    Ms. Bustamante's vitals were reviewed.  Vitals:    10/11/24 0957   BP: (!) 154/81   Pulse: 67   Resp: 18   Temp: 97.8 °F (36.6 °C)   SpO2: 96%         Ms. Bustamante tolerated the first dose of Leqvio well, and denies any adverse side effects.    INCLISIRAN (LEQVIO) 284 MG, was administered SQ, in the back of her RIGHT ARM. No irritation or bleeding at site. Bandaid applied.    Ms. Bustamante tolerated her injection well today, and she voiced no complaints.     Discharge instructions reviewed with patient and she expressed understanding.     Patient's armband was removed and shredded.    Ms. Bustamante was discharged from [facility] in stable condition at 1008.     She is to return in 6 months, on 25  at 1000, for her next LEQVIO injection.       AKUA OSORIO RN  2024  11:26 AM

## 2025-04-02 PROBLEM — E78.49 OTHER HYPERLIPIDEMIA: Status: ACTIVE | Noted: 2025-04-02

## 2025-04-02 RX ORDER — DIPHENHYDRAMINE HYDROCHLORIDE 50 MG/ML
50 INJECTION, SOLUTION INTRAMUSCULAR; INTRAVENOUS
Status: CANCELLED | OUTPATIENT
Start: 2025-04-06

## 2025-04-02 RX ORDER — HYDROCORTISONE SODIUM SUCCINATE 100 MG/2ML
100 INJECTION INTRAMUSCULAR; INTRAVENOUS
Status: CANCELLED | OUTPATIENT
Start: 2025-04-06

## 2025-04-02 RX ORDER — ALBUTEROL SULFATE 90 UG/1
4 INHALANT RESPIRATORY (INHALATION) PRN
Status: CANCELLED | OUTPATIENT
Start: 2025-04-06

## 2025-04-02 RX ORDER — ACETAMINOPHEN 325 MG/1
650 TABLET ORAL
Status: CANCELLED | OUTPATIENT
Start: 2025-04-06

## 2025-04-02 RX ORDER — SODIUM CHLORIDE 9 MG/ML
INJECTION, SOLUTION INTRAVENOUS CONTINUOUS
Status: CANCELLED | OUTPATIENT
Start: 2025-04-06

## 2025-04-02 RX ORDER — EPINEPHRINE 1 MG/ML
0.3 INJECTION, SOLUTION, CONCENTRATE INTRAVENOUS PRN
Status: CANCELLED | OUTPATIENT
Start: 2025-04-06

## 2025-04-02 RX ORDER — ONDANSETRON 2 MG/ML
8 INJECTION INTRAMUSCULAR; INTRAVENOUS
Status: CANCELLED | OUTPATIENT
Start: 2025-04-06

## 2025-04-11 ENCOUNTER — HOSPITAL ENCOUNTER (OUTPATIENT)
Facility: HOSPITAL | Age: 89
Setting detail: INFUSION SERIES
Discharge: HOME | End: 2025-04-14
Payer: MEDICARE

## 2025-04-11 VITALS
OXYGEN SATURATION: 96 % | DIASTOLIC BLOOD PRESSURE: 76 MMHG | SYSTOLIC BLOOD PRESSURE: 150 MMHG | TEMPERATURE: 98.4 F | HEART RATE: 77 BPM | RESPIRATION RATE: 18 BRPM

## 2025-04-11 DIAGNOSIS — I65.23 BILATERAL CAROTID ARTERY STENOSIS: ICD-10-CM

## 2025-04-11 DIAGNOSIS — E78.49 OTHER HYPERLIPIDEMIA: Primary | ICD-10-CM

## 2025-04-11 PROCEDURE — 96372 THER/PROPH/DIAG INJ SC/IM: CPT

## 2025-04-11 PROCEDURE — 6360000002 HC RX W HCPCS: Performed by: FAMILY MEDICINE

## 2025-04-11 RX ORDER — DIPHENHYDRAMINE HYDROCHLORIDE 50 MG/ML
50 INJECTION, SOLUTION INTRAMUSCULAR; INTRAVENOUS
OUTPATIENT
Start: 2025-10-10

## 2025-04-11 RX ORDER — HYDROCORTISONE SODIUM SUCCINATE 100 MG/2ML
100 INJECTION INTRAMUSCULAR; INTRAVENOUS
OUTPATIENT
Start: 2025-10-10

## 2025-04-11 RX ORDER — SODIUM CHLORIDE 9 MG/ML
INJECTION, SOLUTION INTRAVENOUS CONTINUOUS
OUTPATIENT
Start: 2025-10-10

## 2025-04-11 RX ORDER — ALBUTEROL SULFATE 90 UG/1
4 INHALANT RESPIRATORY (INHALATION) PRN
OUTPATIENT
Start: 2025-10-10

## 2025-04-11 RX ORDER — EPINEPHRINE 1 MG/ML
0.3 INJECTION, SOLUTION, CONCENTRATE INTRAVENOUS PRN
OUTPATIENT
Start: 2025-10-10

## 2025-04-11 RX ORDER — ACETAMINOPHEN 325 MG/1
650 TABLET ORAL
OUTPATIENT
Start: 2025-10-10

## 2025-04-11 RX ORDER — ONDANSETRON 2 MG/ML
8 INJECTION INTRAMUSCULAR; INTRAVENOUS
OUTPATIENT
Start: 2025-10-10

## 2025-04-11 RX ADMIN — INCLISIRAN 284 MG: 284 INJECTION, SOLUTION SUBCUTANEOUS at 10:19

## 2025-04-11 NOTE — PROGRESS NOTES
[facility] Progress Note    Date: 2025    Name: Kristen Bustamante    MRN: [id number]         : 1935      LEQVIO Q6 MONTHS       Ms. Bustamante arrived to [facility] at 1005.    Ms. Bustamante was assessed and education was provided.     Pt reports the Leqvio injections have been working really well for her and she stated that her cholesterol has decreased about \"100 points\" since starting on the injections. She reports she has tolerated the injections well with no noticeable side effects.    Ms. Bustamante's vitals were reviewed.  Vitals:    25 1005   BP: (!) 150/76   Pulse: 77   Resp: 18   Temp: 98.4 °F (36.9 °C)   SpO2: 96%       Leqvio 284mg was administered as ordered SQ in patient's L upper arm. Band-aid applied to site.     Ms. Bustamante tolerated well without complaints.    Discharge/ follow-up instructions discussed w/ pt. Pt verbalized understanding.    Pt armband removed & shredded.    Ms. Bustamante was discharged from [facility] in stable condition at 1020. She is to return to [facility] on 10/10/25 at 1000 for her next Leqvio injection appointment.    Sonia Ribera RN  2025

## 2025-04-21 ENCOUNTER — HOSPITAL ENCOUNTER (EMERGENCY)
Age: 89
Discharge: HOME | End: 2025-04-21
Attending: EMERGENCY MEDICINE
Payer: MEDICARE

## 2025-04-21 VITALS
HEIGHT: 64 IN | HEART RATE: 71 BPM | OXYGEN SATURATION: 97 % | RESPIRATION RATE: 18 BRPM | SYSTOLIC BLOOD PRESSURE: 170 MMHG | DIASTOLIC BLOOD PRESSURE: 81 MMHG | BODY MASS INDEX: 29.71 KG/M2 | WEIGHT: 174 LBS | TEMPERATURE: 97.4 F

## 2025-04-21 DIAGNOSIS — N39.0 URINARY TRACT INFECTION WITH HEMATURIA, SITE UNSPECIFIED: Primary | ICD-10-CM

## 2025-04-21 DIAGNOSIS — R31.9 URINARY TRACT INFECTION WITH HEMATURIA, SITE UNSPECIFIED: Primary | ICD-10-CM

## 2025-04-21 LAB
APPEARANCE UR: CLEAR
BACTERIA URNS QL MICRO: ABNORMAL /HPF
BILIRUB UR QL: NEGATIVE
COLOR UR: ABNORMAL
EPITH CASTS URNS QL MICRO: ABNORMAL /LPF (ref 0–20)
GLUCOSE UR STRIP.AUTO-MCNC: NEGATIVE MG/DL
HGB UR QL STRIP: ABNORMAL
KETONES UR QL STRIP.AUTO: NEGATIVE MG/DL
LEUKOCYTE ESTERASE UR QL STRIP.AUTO: ABNORMAL
NITRITE UR QL STRIP.AUTO: POSITIVE
PH UR STRIP: 7.5 (ref 5–8)
PROT UR STRIP-MCNC: NEGATIVE MG/DL
RBC #/AREA URNS HPF: ABNORMAL /HPF (ref 0–2)
SP GR UR REFRACTOMETRY: 1.01 (ref 1–1.03)
UROBILINOGEN UR QL STRIP.AUTO: 1 EU/DL (ref 0.2–1)
WBC URNS QL MICRO: ABNORMAL /HPF (ref 0–4)

## 2025-04-21 PROCEDURE — 81001 URINALYSIS AUTO W/SCOPE: CPT

## 2025-04-21 PROCEDURE — 99283 EMERGENCY DEPT VISIT LOW MDM: CPT

## 2025-04-21 PROCEDURE — 87086 URINE CULTURE/COLONY COUNT: CPT

## 2025-04-21 PROCEDURE — 6370000000 HC RX 637 (ALT 250 FOR IP): Performed by: EMERGENCY MEDICINE

## 2025-04-21 RX ORDER — SULFAMETHOXAZOLE AND TRIMETHOPRIM 800; 160 MG/1; MG/1
1 TABLET ORAL EVERY 12 HOURS SCHEDULED
Status: DISCONTINUED | OUTPATIENT
Start: 2025-04-21 | End: 2025-04-21

## 2025-04-21 RX ORDER — SULFAMETHOXAZOLE AND TRIMETHOPRIM 800; 160 MG/1; MG/1
1 TABLET ORAL EVERY 12 HOURS SCHEDULED
Status: DISCONTINUED | OUTPATIENT
Start: 2025-04-21 | End: 2025-04-21 | Stop reason: HOSPADM

## 2025-04-21 RX ORDER — PHENAZOPYRIDINE HYDROCHLORIDE 100 MG/1
100 TABLET, FILM COATED ORAL
Status: COMPLETED | OUTPATIENT
Start: 2025-04-21 | End: 2025-04-21

## 2025-04-21 RX ORDER — SULFAMETHOXAZOLE AND TRIMETHOPRIM 800; 160 MG/1; MG/1
1 TABLET ORAL 2 TIMES DAILY
Qty: 14 TABLET | Refills: 0 | Status: SHIPPED | OUTPATIENT
Start: 2025-04-21 | End: 2025-04-25 | Stop reason: SINTOL

## 2025-04-21 RX ORDER — PHENAZOPYRIDINE HYDROCHLORIDE 100 MG/1
100 TABLET, FILM COATED ORAL 3 TIMES DAILY PRN
Qty: 6 TABLET | Refills: 0 | Status: SHIPPED | OUTPATIENT
Start: 2025-04-21 | End: 2025-04-24

## 2025-04-21 RX ADMIN — SULFAMETHOXAZOLE AND TRIMETHOPRIM 1 TABLET: 800; 160 TABLET ORAL at 19:10

## 2025-04-21 RX ADMIN — PHENAZOPYRIDINE 100 MG: 100 TABLET ORAL at 19:10

## 2025-04-21 ASSESSMENT — PAIN DESCRIPTION - PAIN TYPE: TYPE: ACUTE PAIN

## 2025-04-21 ASSESSMENT — PAIN - FUNCTIONAL ASSESSMENT: PAIN_FUNCTIONAL_ASSESSMENT: 0-10

## 2025-04-21 ASSESSMENT — PAIN DESCRIPTION - DESCRIPTORS: DESCRIPTORS: BURNING

## 2025-04-21 ASSESSMENT — LIFESTYLE VARIABLES
HOW MANY STANDARD DRINKS CONTAINING ALCOHOL DO YOU HAVE ON A TYPICAL DAY: PATIENT DOES NOT DRINK
HOW OFTEN DO YOU HAVE A DRINK CONTAINING ALCOHOL: NEVER

## 2025-04-21 ASSESSMENT — PAIN DESCRIPTION - LOCATION: LOCATION: OTHER (COMMENT)

## 2025-04-21 ASSESSMENT — PAIN SCALES - GENERAL: PAINLEVEL_OUTOF10: 3

## 2025-04-21 NOTE — ED TRIAGE NOTES
Pt reports burning with urination that started this morning, pt did take two azo capsules which have helped.

## 2025-04-22 LAB
BACTERIA SPEC CULT: NORMAL
COLONY COUNT, CNT: NORMAL
COLONY COUNT, CNT: NORMAL
Lab: NORMAL

## 2025-04-25 ENCOUNTER — HOSPITAL ENCOUNTER (EMERGENCY)
Age: 89
Discharge: HOME | End: 2025-04-25
Attending: EMERGENCY MEDICINE
Payer: MEDICARE

## 2025-04-25 VITALS
HEIGHT: 64 IN | DIASTOLIC BLOOD PRESSURE: 89 MMHG | SYSTOLIC BLOOD PRESSURE: 133 MMHG | OXYGEN SATURATION: 97 % | TEMPERATURE: 97.5 F | WEIGHT: 174 LBS | BODY MASS INDEX: 29.71 KG/M2 | HEART RATE: 73 BPM | RESPIRATION RATE: 18 BRPM

## 2025-04-25 DIAGNOSIS — M25.50 ARTHRALGIA, UNSPECIFIED JOINT: Primary | ICD-10-CM

## 2025-04-25 DIAGNOSIS — T50.905A ADVERSE EFFECT OF DRUG, INITIAL ENCOUNTER: ICD-10-CM

## 2025-04-25 PROCEDURE — 99283 EMERGENCY DEPT VISIT LOW MDM: CPT

## 2025-04-25 RX ORDER — CIPROFLOXACIN 250 MG/1
250 TABLET, FILM COATED ORAL 2 TIMES DAILY
Qty: 8 TABLET | Refills: 0 | Status: SHIPPED | OUTPATIENT
Start: 2025-04-25 | End: 2025-04-29

## 2025-04-25 ASSESSMENT — PAIN DESCRIPTION - ORIENTATION: ORIENTATION: RIGHT;LEFT

## 2025-04-25 ASSESSMENT — PAIN DESCRIPTION - LOCATION: LOCATION: KNEE

## 2025-04-25 ASSESSMENT — PAIN - FUNCTIONAL ASSESSMENT: PAIN_FUNCTIONAL_ASSESSMENT: 0-10

## 2025-04-25 ASSESSMENT — LIFESTYLE VARIABLES
HOW OFTEN DO YOU HAVE A DRINK CONTAINING ALCOHOL: NEVER
HOW MANY STANDARD DRINKS CONTAINING ALCOHOL DO YOU HAVE ON A TYPICAL DAY: PATIENT DOES NOT DRINK

## 2025-04-25 ASSESSMENT — PAIN SCALES - GENERAL: PAINLEVEL_OUTOF10: 8

## 2025-04-25 NOTE — ED TRIAGE NOTES
Pt states that she was seen in this ED Monday, and was given an antibiotic for a UTI. She states that she doesn't know is she is having a reaction to it, but is complaining of lower back pain and joint pain.

## 2025-04-25 NOTE — ED PROVIDER NOTES
[facility] EMERGENCY DEPARTMENT  EMERGENCY DEPARTMENT HISTORY AND PHYSICAL EXAM        Pt Name: Kristen Bustamante  MRN: [id number]  Birthdate 1935  Date of evaluation: 4/25/2025  Provider: Edouard Marshall DO      History of Presenting Illness         Chief Complaint   Patient presents with    Joint Pain       History was provided by: Patient    Location/Duration/Severity/Modifying factors   Kristen Bustamante is a 89 y.o. female who arrived to the emergency department by by private vehicle with a complaint Joint Pain        Patient is a 89-year-old female who presents with a chief complaint of bodyaches, most notable in her low back, hips and legs.  Patient reports that it began over the last 1 to 2 days.  She says that just around 3 days ago she was seen here for UTI and was given prescription for sulfamethoxazole trimethoprim for UTI.  Urinary symptoms have improved but she thinks that it could be a reaction to that medicine as she has not had it before.  She denies any rash, no difficulty breathing, no fever, no flank pain abdominal pain or vomiting.          There are no other complaints, changes, or physical findings at this time.    PCP: Margo Rosado DO    No current facility-administered medications for this encounter.     Current Outpatient Medications   Medication Sig Dispense Refill    ciprofloxacin (CIPRO) 250 MG tablet Take 1 tablet by mouth 2 times daily for 4 days 8 tablet 0    Inclisiran Sodium (LEQVIO SC) Inject 284 mg into the skin Leqvio was initiated on 1-12-24, then will be given again in 3 months, and then every 6 months maintenance      amLODIPine (NORVASC) 5 MG tablet Take 5 mg by mouth daily      aspirin 81 MG EC tablet Take by mouth daily      Calcium Carb-Cholecalciferol 600-10 MG-MCG CAPS Take 1 capsule by mouth daily      levothyroxine (SYNTHROID) 50 MCG tablet Take 25 mcg by mouth daily      nebivolol (BYSTOLIC) 10 MG tablet Take 1 tablet by mouth daily      omeprazole

## 2025-04-25 NOTE — DISCHARGE INSTRUCTIONS
I sent the prescription in for the ciprofloxacin which you took most recently in January.  If you experience worsening symptoms or you have new or worsening urinary symptoms please return or call your primary care doctor.  If you develop a rash, worsening pain, high fever abdominal pain flank pain vomiting or any other symptoms please return to the emergency room immediately.        Thank you for choosing our Emergency Department for your care.  It is our privilege to care for you in your time of need.  In the next several days, you may receive a survey via email or mailed to your home about your experience with our team.  We would greatly appreciate you taking a few minutes to complete the survey, as we use this information to learn what we have done well and what we could be doing better. Thank you for trusting us with your care!    Below you will find a list of your tests from today's visit.   Labs and Radiology Studies  No results found for this or any previous visit (from the past 12 hours).  No results found.  ------------------------------------------------------------------------------------------------------------  The evaluation and treatment you received in the Emergency Department were for an urgent problem. It is important that you follow-up with a doctor, nurse practitioner, or physician assistant to:  (1) confirm your diagnosis,  (2) re-evaluation of changes in your illness and treatment, and (3) for ongoing care. Please take your discharge instructions with you when you go to your follow-up appointment.     If you have any problem arranging a follow-up appointment, contact us!  If your symptoms become worse or you do not improve as expected, please return to us. We are available 24 hours a day.     If a prescription has been provided, please fill it as soon as possible to prevent a delay in treatment. If you have any questions or reservations about taking the medication due to side effects or

## 2025-04-26 NOTE — ED PROVIDER NOTES
affecting Dx or Tx:  None    Records Reviewed (source and summary): Old medical records.  Nursing notes.      CC/HPI Summary, DDx, ED Course, and Reassessment:     Patient with long history of recurrent UTIs, describes similar symptoms which began this morning and she took 2 Azo's with improvement of her dysuria.    On exam, she appears in no distress.  There is no abdomen tenderness.    Urine appears infected, patient stable for discharge on Bactrim.  She was given precautions for returning to ED.  She is to follow-up with PCP.    Disposition Considerations (Tests not done, Shared Decision Making, Pt Expectation of Test or Tx.):      FINAL IMPRESSION     1. Urinary tract infection with hematuria, site unspecified         DISPOSITION/PLAN   DISPOSITION Decision To Discharge 04/21/2025 07:15:23 PM               Discharged     PATIENT REFERRED TO:  No follow-up provider specified.     DISCHARGE MEDICATIONS:  Discharge Medication List as of 4/21/2025  7:15 PM             Details   phenazopyridine (PYRIDIUM) 100 MG tablet Take 1 tablet by mouth 3 times daily as needed for Pain, Disp-6 tablet, R-0Normal      sulfamethoxazole-trimethoprim (BACTRIM DS) 800-160 MG per tablet Take 1 tablet by mouth 2 times daily for 7 days, Disp-14 tablet, R-0Normal                Details   Inclisiran Sodium (LEQVIO SC) Inject 284 mg into the skin Leqvio was initiated on 1-12-24, then will be given again in 3 months, and then every 6 months maintenanceHistorical Med      amLODIPine (NORVASC) 5 MG tablet Take 5 mg by mouth dailyHistorical Med      aspirin 81 MG EC tablet Take by mouth dailyHistorical Med      Calcium Carb-Cholecalciferol 600-10 MG-MCG CAPS Take 1 capsule by mouth dailyHistorical Med      levothyroxine (SYNTHROID) 50 MCG tablet Take 25 mcg by mouth dailyHistorical Med      nebivolol (BYSTOLIC) 10 MG tablet Take 1 tablet by mouth dailyHistorical Med      omeprazole (PRILOSEC) 40 MG delayed release capsule Take 40 mg by mouth